# Patient Record
Sex: FEMALE | Race: WHITE
[De-identification: names, ages, dates, MRNs, and addresses within clinical notes are randomized per-mention and may not be internally consistent; named-entity substitution may affect disease eponyms.]

---

## 2018-02-12 ENCOUNTER — HOSPITAL ENCOUNTER (OUTPATIENT)
Dept: HOSPITAL 89 - LAB | Age: 78
End: 2018-02-12
Attending: INTERNAL MEDICINE
Payer: MEDICARE

## 2018-02-12 VITALS — BODY MASS INDEX: 25.37 KG/M2

## 2018-02-12 DIAGNOSIS — E78.5: ICD-10-CM

## 2018-02-12 DIAGNOSIS — D72.821: Primary | ICD-10-CM

## 2018-02-12 DIAGNOSIS — N18.3: ICD-10-CM

## 2018-02-12 DIAGNOSIS — E21.3: ICD-10-CM

## 2018-02-12 DIAGNOSIS — E83.52: ICD-10-CM

## 2018-02-12 DIAGNOSIS — I12.9: ICD-10-CM

## 2018-02-12 LAB
LDLC SERPL-MCNC: 102 MG/DL
PLATELET COUNT, AUTOMATED: 233 K/UL (ref 150–450)

## 2018-02-12 PROCEDURE — 83718 ASSAY OF LIPOPROTEIN: CPT

## 2018-02-12 PROCEDURE — 82310 ASSAY OF CALCIUM: CPT

## 2018-02-12 PROCEDURE — 84295 ASSAY OF SERUM SODIUM: CPT

## 2018-02-12 PROCEDURE — 84132 ASSAY OF SERUM POTASSIUM: CPT

## 2018-02-12 PROCEDURE — 82465 ASSAY BLD/SERUM CHOLESTEROL: CPT

## 2018-02-12 PROCEDURE — 84478 ASSAY OF TRIGLYCERIDES: CPT

## 2018-02-12 PROCEDURE — 36415 COLL VENOUS BLD VENIPUNCTURE: CPT

## 2018-02-12 PROCEDURE — 82374 ASSAY BLOOD CARBON DIOXIDE: CPT

## 2018-02-12 PROCEDURE — 82565 ASSAY OF CREATININE: CPT

## 2018-02-12 PROCEDURE — 82947 ASSAY GLUCOSE BLOOD QUANT: CPT

## 2018-02-12 PROCEDURE — 82306 VITAMIN D 25 HYDROXY: CPT

## 2018-02-12 PROCEDURE — 85025 COMPLETE CBC W/AUTO DIFF WBC: CPT

## 2018-02-12 PROCEDURE — 83970 ASSAY OF PARATHORMONE: CPT

## 2018-02-12 PROCEDURE — 82435 ASSAY OF BLOOD CHLORIDE: CPT

## 2018-02-12 PROCEDURE — 84520 ASSAY OF UREA NITROGEN: CPT

## 2018-02-23 ENCOUNTER — HOSPITAL ENCOUNTER (OUTPATIENT)
Dept: HOSPITAL 89 - RAD | Age: 78
End: 2018-02-23
Attending: INTERNAL MEDICINE
Payer: MEDICARE

## 2018-02-23 VITALS — BODY MASS INDEX: 25.37 KG/M2

## 2018-02-23 DIAGNOSIS — Z78.0: ICD-10-CM

## 2018-02-23 DIAGNOSIS — Z13.820: Primary | ICD-10-CM

## 2018-02-23 PROCEDURE — 77080 DXA BONE DENSITY AXIAL: CPT

## 2018-02-23 NOTE — RADIOLOGY IMAGING REPORT
FACILITY: [facility] 

 

PATIENT NAME: Danica Zapata

: 1940

MR: [id number]

V: [id number]

EXAM DATE: 

ORDERING PHYSICIAN: SAEED FOSTER

TECHNOLOGIST: 

 

Location: [facility]

Patient: Danica Zapata

: 1940

MRN: [id number]

Visit/Account:[id number]

Date of Sevice:  2018

 

ACCESSION #: [id number]

 

DEXA Scan

 

Clinical history:  Postmenopausal estrogen deficiency.

 

Comparison: None available.

 

LUMBAR SPINE:

The bone mineral density (BMD) measured from L2 and L4 correlates with a Z-score 6.6 and a T-score of
 5.1 which is Normal as defined by the World Health Organization.  The corresponding risk of fracture
 in the lumbar spine is Not increased compared with a young adult reference population.

 

HIP:

Bone mineral density (BMD) measured in the Left total hip region correlates with a Z-score 1.7 and a 
T-score of 0.1 which is Normal as defined by the World Health Organization.  The corresponding risk o
f fracture in the hip is Not increased compared with a young adult reference population.   T score le
ft femoral neck -0.2

 

Bone mineral density (BMD) measured in the Femoral Neck region measures 1.011 g/cm2.

 

Impression:

1.  Lumbar spine:  Normal.

2.  Left Hip:  Normal.

3.  Femoral Neck:  Bone Mineral Density is 1.011 g/cm2

 

The next DEXA scan of this patient should include the following sites: L1-L4 and the left hip.

 

FRAX? WHO Fracture Risk Assessment Tool link:

<http://www.shef.ac.uk/FRAX/tool.jsp?locationValue=9>

 

PLEASE NOTE:

1)  The World Health Organization defines low BMD as follows:

                                                                       T-score

                   Normal                                  > -1

                   Osteopenia                           < -1 and  > -2.5

                   Osteoporosis                         < -2.5 without fractures

                   Established osteoporosis       < -2.5 with fractures

2)  In general, you may wish to consider:

                    Diagnosis                  Treatment                       Follow-up DEXA

 

                    Normal BMD            Prevention                      2-3 years

                    Osteopenia                Prevention/therapy         1-2 years

                    Osteoporosis             Therapy                           Yearly

3)  Fracture risk estimated from the T-score is more accurate for vertebral fractures (often spontane
ous) than for hip fractures.

 

 

Report Dictated By: Aleyda Blackburn MD at 2018 12:07 PM

 

Report E-Signed By: Aleyda Blackburn MD  at 2018 12:08 PM

 

WSN:[id number]

## 2018-03-05 ENCOUNTER — HOSPITAL ENCOUNTER (OUTPATIENT)
Dept: HOSPITAL 89 - LAB | Age: 78
End: 2018-03-05
Attending: INTERNAL MEDICINE
Payer: MEDICARE

## 2018-03-05 VITALS — BODY MASS INDEX: 25.37 KG/M2

## 2018-03-05 DIAGNOSIS — Z86.39: Primary | ICD-10-CM

## 2018-03-05 PROCEDURE — 82310 ASSAY OF CALCIUM: CPT

## 2018-03-05 PROCEDURE — 36415 COLL VENOUS BLD VENIPUNCTURE: CPT

## 2018-04-05 ENCOUNTER — HOSPITAL ENCOUNTER (OUTPATIENT)
Dept: HOSPITAL 89 - LAB | Age: 78
End: 2018-04-05
Attending: INTERNAL MEDICINE
Payer: MEDICARE

## 2018-04-05 ENCOUNTER — HOSPITAL ENCOUNTER (INPATIENT)
Dept: HOSPITAL 89 - ER | Age: 78
LOS: 14 days | Discharge: NURSING, REHAB OR LONG-TERM CARE | DRG: 208 | End: 2018-04-19
Attending: INTERNAL MEDICINE | Admitting: INTERNAL MEDICINE
Payer: MEDICARE

## 2018-04-05 ENCOUNTER — HOSPITAL ENCOUNTER (OUTPATIENT)
Dept: HOSPITAL 89 - CT | Age: 78
End: 2018-04-05
Attending: INTERNAL MEDICINE
Payer: MEDICARE

## 2018-04-05 VITALS — SYSTOLIC BLOOD PRESSURE: 193 MMHG | DIASTOLIC BLOOD PRESSURE: 101 MMHG

## 2018-04-05 VITALS
WEIGHT: 183 LBS | WEIGHT: 183 LBS | BODY MASS INDEX: 31.24 KG/M2 | HEIGHT: 64 IN | HEIGHT: 64 IN | BODY MASS INDEX: 31.24 KG/M2

## 2018-04-05 VITALS — BODY MASS INDEX: 25.37 KG/M2 | BODY MASS INDEX: 25.37 KG/M2

## 2018-04-05 VITALS — SYSTOLIC BLOOD PRESSURE: 165 MMHG | DIASTOLIC BLOOD PRESSURE: 75 MMHG

## 2018-04-05 VITALS — DIASTOLIC BLOOD PRESSURE: 62 MMHG | SYSTOLIC BLOOD PRESSURE: 170 MMHG

## 2018-04-05 VITALS — SYSTOLIC BLOOD PRESSURE: 194 MMHG | DIASTOLIC BLOOD PRESSURE: 92 MMHG

## 2018-04-05 DIAGNOSIS — Z88.8: ICD-10-CM

## 2018-04-05 DIAGNOSIS — I27.20: ICD-10-CM

## 2018-04-05 DIAGNOSIS — J18.1: ICD-10-CM

## 2018-04-05 DIAGNOSIS — J96.00: ICD-10-CM

## 2018-04-05 DIAGNOSIS — Z90.710: ICD-10-CM

## 2018-04-05 DIAGNOSIS — F17.210: ICD-10-CM

## 2018-04-05 DIAGNOSIS — I51.7: Primary | ICD-10-CM

## 2018-04-05 DIAGNOSIS — N18.3: ICD-10-CM

## 2018-04-05 DIAGNOSIS — I13.0: ICD-10-CM

## 2018-04-05 DIAGNOSIS — I50.21: ICD-10-CM

## 2018-04-05 DIAGNOSIS — E78.5: ICD-10-CM

## 2018-04-05 DIAGNOSIS — Z98.82: ICD-10-CM

## 2018-04-05 DIAGNOSIS — Z92.21: ICD-10-CM

## 2018-04-05 DIAGNOSIS — J44.0: ICD-10-CM

## 2018-04-05 DIAGNOSIS — I47.1: ICD-10-CM

## 2018-04-05 DIAGNOSIS — E87.6: ICD-10-CM

## 2018-04-05 DIAGNOSIS — J44.1: Primary | ICD-10-CM

## 2018-04-05 DIAGNOSIS — I46.2: ICD-10-CM

## 2018-04-05 DIAGNOSIS — E03.9: ICD-10-CM

## 2018-04-05 DIAGNOSIS — R94.31: ICD-10-CM

## 2018-04-05 DIAGNOSIS — K92.2: ICD-10-CM

## 2018-04-05 DIAGNOSIS — Z99.81: ICD-10-CM

## 2018-04-05 DIAGNOSIS — R91.8: ICD-10-CM

## 2018-04-05 DIAGNOSIS — E83.52: ICD-10-CM

## 2018-04-05 DIAGNOSIS — F41.8: ICD-10-CM

## 2018-04-05 DIAGNOSIS — M41.84: ICD-10-CM

## 2018-04-05 DIAGNOSIS — Z85.3: ICD-10-CM

## 2018-04-05 DIAGNOSIS — I25.10: Primary | ICD-10-CM

## 2018-04-05 LAB — PLATELET COUNT, AUTOMATED: 227 K/UL (ref 150–450)

## 2018-04-05 PROCEDURE — 83735 ASSAY OF MAGNESIUM: CPT

## 2018-04-05 PROCEDURE — 82374 ASSAY BLOOD CARBON DIOXIDE: CPT

## 2018-04-05 PROCEDURE — 94660 CPAP INITIATION&MGMT: CPT

## 2018-04-05 PROCEDURE — 84484 ASSAY OF TROPONIN QUANT: CPT

## 2018-04-05 PROCEDURE — 86850 RBC ANTIBODY SCREEN: CPT

## 2018-04-05 PROCEDURE — 82435 ASSAY OF BLOOD CHLORIDE: CPT

## 2018-04-05 PROCEDURE — 82803 BLOOD GASES ANY COMBINATION: CPT

## 2018-04-05 PROCEDURE — 93005 ELECTROCARDIOGRAM TRACING: CPT

## 2018-04-05 PROCEDURE — 94003 VENT MGMT INPAT SUBQ DAY: CPT

## 2018-04-05 PROCEDURE — 86900 BLOOD TYPING SEROLOGIC ABO: CPT

## 2018-04-05 PROCEDURE — 86920 COMPATIBILITY TEST SPIN: CPT

## 2018-04-05 PROCEDURE — 84075 ASSAY ALKALINE PHOSPHATASE: CPT

## 2018-04-05 PROCEDURE — 71046 X-RAY EXAM CHEST 2 VIEWS: CPT

## 2018-04-05 PROCEDURE — 85379 FIBRIN DEGRADATION QUANT: CPT

## 2018-04-05 PROCEDURE — 36415 COLL VENOUS BLD VENIPUNCTURE: CPT

## 2018-04-05 PROCEDURE — 94770: CPT

## 2018-04-05 PROCEDURE — 84132 ASSAY OF SERUM POTASSIUM: CPT

## 2018-04-05 PROCEDURE — 82310 ASSAY OF CALCIUM: CPT

## 2018-04-05 PROCEDURE — 97167 OT EVAL HIGH COMPLEX 60 MIN: CPT

## 2018-04-05 PROCEDURE — 84520 ASSAY OF UREA NITROGEN: CPT

## 2018-04-05 PROCEDURE — 82040 ASSAY OF SERUM ALBUMIN: CPT

## 2018-04-05 PROCEDURE — 82947 ASSAY GLUCOSE BLOOD QUANT: CPT

## 2018-04-05 PROCEDURE — 84450 TRANSFERASE (AST) (SGOT): CPT

## 2018-04-05 PROCEDURE — 84460 ALANINE AMINO (ALT) (SGPT): CPT

## 2018-04-05 PROCEDURE — 71045 X-RAY EXAM CHEST 1 VIEW: CPT

## 2018-04-05 PROCEDURE — 82247 BILIRUBIN TOTAL: CPT

## 2018-04-05 PROCEDURE — 86901 BLOOD TYPING SEROLOGIC RH(D): CPT

## 2018-04-05 PROCEDURE — 85025 COMPLETE CBC W/AUTO DIFF WBC: CPT

## 2018-04-05 PROCEDURE — 99285 EMERGENCY DEPT VISIT HI MDM: CPT

## 2018-04-05 PROCEDURE — 93306 TTE W/DOPPLER COMPLETE: CPT

## 2018-04-05 PROCEDURE — 84295 ASSAY OF SERUM SODIUM: CPT

## 2018-04-05 PROCEDURE — 84443 ASSAY THYROID STIM HORMONE: CPT

## 2018-04-05 PROCEDURE — 83880 ASSAY OF NATRIURETIC PEPTIDE: CPT

## 2018-04-05 PROCEDURE — 94668 MNPJ CHEST WALL SBSQ: CPT

## 2018-04-05 PROCEDURE — 82565 ASSAY OF CREATININE: CPT

## 2018-04-05 PROCEDURE — 94002 VENT MGMT INPAT INIT DAY: CPT

## 2018-04-05 PROCEDURE — 84155 ASSAY OF PROTEIN SERUM: CPT

## 2018-04-05 PROCEDURE — 97162 PT EVAL MOD COMPLEX 30 MIN: CPT

## 2018-04-05 PROCEDURE — 71275 CT ANGIOGRAPHY CHEST: CPT

## 2018-04-05 PROCEDURE — 36600 WITHDRAWAL OF ARTERIAL BLOOD: CPT

## 2018-04-05 PROCEDURE — 94640 AIRWAY INHALATION TREATMENT: CPT

## 2018-04-05 RX ADMIN — DILTIAZEM HYDROCHLORIDE ONE MG: 60 CAPSULE, EXTENDED RELEASE ORAL at 22:30

## 2018-04-05 RX ADMIN — IPRATROPIUM BROMIDE AND ALBUTEROL SULFATE SCH ML: .5; 3 SOLUTION RESPIRATORY (INHALATION) at 21:45

## 2018-04-05 RX ADMIN — DILTIAZEM HYDROCHLORIDE ONE MG: 60 CAPSULE, EXTENDED RELEASE ORAL at 21:45

## 2018-04-05 RX ADMIN — FLUTICASONE PROPIONATE AND SALMETEROL SCH EACH: 50; 250 POWDER RESPIRATORY (INHALATION) at 21:50

## 2018-04-05 NOTE — EKG
[place] [facility] [place] 

 

PATIENT NAME: CARLINE JACKSON

: [id number]

MR: [id number]

V: [id number]

EXAM DATE: 

ORDERING PHYSICIAN: SAEED FOSTER

TECHNOLOGIST: ROMINA

 

Test Reason : DYSPNEA

Blood Pressure : ***/*** mmHG

Vent. Rate : 079 BPM     Atrial Rate : 079 BPM

   P-R Int : 128 ms          QRS Dur : 092 ms

    QT Int : 400 ms       P-R-T Axes : 070 -84 076 degrees

   QTc Int : 458 ms

 

Sinus rhythm with premature supraventricular complexes

Left axis deviation

Inferior infarct , age undetermined

Abnormal ECG

No previous ECGs available

 

Referred By:  CRISTIAN           Confirmed By:

## 2018-04-05 NOTE — HISTORY & PHYSICAL
History of Present Illness


Chief Complaint


The patient is a 77 year old female with PMH significant for COPD with severe 

obstructive defect by PFTs in 2016 who presents with increased shortness of 

breath for the past 24-48hours.


History of Present Illness


The patient is a current smoker. She is on multiple medications for her COPD. 

She did participate in pulmonary rehabilitation in 2016. Pre rehab PFTs showed 

an FEV1 of .66. She had been on oxygen 2L at HS only but about 4 months ago had 

to increase it to 24 hours daily. 





Her shortness of breath has worsened over the past 24-48 hours. The patient 

denies fever, chills or cough.  She had a URI at the end of 2017 and was 

treated with Levaquin and steroids. Those symptoms resolved but she remained on 

oxygen /. She notes that with the shortness of breath her chest has felt 

tight and she has had difficulty getting air in and out. She has been wheezing 

more. She does have risk factors for CAD including +FH, smoking, HTN and 

hyperlipidemia. She did have some LE edema but this resolved when she recently 

stopped taking amlodipine. Her BP has been high off of amlodipine and 

diuretics. Diuretics were stopped due to hypercalcemia. She has a history of 

hyperparathyroidism and is s/p parathyroidectomy. Her PCP is monitoring her 

calcium level which has improved off of calcium supplements and diuretics. The 

patient states she has not smoked for 2 days due to shortness of breath. She 

would like to quit smoking.





She denies orthopnea or PND. She has not had palpitations, diaphoresis or 

nausea.





History


Problems:  


(1) Breast cancer


Status:  Resolved


(2) Hyperlipidemia


Status:  Chronic


(3) CKD (chronic kidney disease), stage III


Status:  Chronic


(4) COPD (chronic obstructive pulmonary disease)


Status:  Chronic


(5) H/O hyperparathyroidism


Status:  Chronic


(6) Hypothyroidism


Status:  Chronic


(7) Depression


Status:  Chronic


(8) Anxiety


Status:  Chronic


(9) Upper GI bleed


Status:  Chronic


(10) Ulcer


Status:  Resolved


(11) Hypertension


Status:  Chronic


Home Meds


Active Scripts


Benazepril Hcl (BENAZEPRIL HCL) 40 Mg Tablet, 40 MG PO QDAY, #5 TAB 0 Refills


   Prov:SAEED DAVIS MD         18


Fluoxetine Hcl (PROZAC) 20 Mg Capsule, 20 MG PO QDAY, #90 CAPSULE 1 Refill


   Prov:SAEED DAVIS MD         3/13/18


Alprazolam (ALPRAZOLAM) 0.5 Mg Tab.rapdis, 0.5 MG PO QAM for Anxiety MDD .5 mg 

for 60 Days, #60 TAB


   Prov:SAEED DAVIS MD         18


Umeclidinium Bromide (Incruse Ellipta) 62.5 Mcg Blst.w.dev, 1 INH INH DAILY, #1 

INH 11 Refills


   Prov:SAEED DAVIS MD         18


Albuterol Sulfate 90 Mcg/Act (PROAIR HFA 90 MCG/ACT) 8.5 Gm Hfa.aer.ad, 2 PUFF 

IH Q4-6H, #1 INHALER 11 Refills


   Prov:SAEED DAVIS MD         18


Fluticasone/Salmeterol (ADVAIR 250-50 DISKUS) 1 Each Disk.w.dev, 1 PUFF IH BID, 

#3 INH 3 Refills


   Prov:SAEED DAVIS MD         18


Reported Medications


Vit A/Vit C/Vit E/Zinc/Copper (PRESERVISION AREDS SOFTGEL) 1 Each Capsule, 1 

EACH PO DAILY, CAPSULE


   18


Oxygen (OXYGEN)  Inha, 2 L INH, L


   17


Levothyroxine Sodium (LEVOTHYROXINE SODIUM) 100 Mcg Tablet, 100 MCG PO QDAY, TAB


   17


Discontinued Reported Medications


[areds]   No Conflict Check


   17


Multivitamin (DAILY MULTIPLE VITAMIN) 1 Each Tablet, 1 TAB PO DAILY


   17


Allergies:  


Coded Allergies:  


     NSAIDS (Non-Steroidal Anti-Inflamma (Verified  Adverse Reaction, Unknown, 

Pt does not take NSAIDS, 18)


 PT HAS HISTORY OF GASTRIC ULCERS.


Patient History:  


FH: arthritis


  MOTHER, , Age:89


  BROTHER OR SISTER


  BROTHER OR SISTER


FH: breast cancer


  BROTHER OR SISTER


FH: cancer


FH: heart attack


  FATHER, , Age:73


  MOTHER, , Age:89


FH: heart disease


  BROTHER OR SISTER


Other Social/Family Hx


The patient is  and lives with her  in [place]. She is a retired 

nurse. She has children but none of them live in [place]. Her  has 

dementia and she cares for him.


Hx Smoking:  Yes


Smoking Status:  Current: Every Day Smoker, Current: Some Days Smoker


Exposure to Second Hand Smoke?:  No


Caffeine Intake:  Coffee


Caffeine/Cups Per Day:  2-3


Hx Alcohol Use:  Yes


Hx Substance Use Disorder:  No


Social Drug Use:  Never


History of IV Drug Use:  No





Review of Systems


All Systems Reviewed/Normal:  Yes, Except as Noted


Constitutional:  No Fever


Cardiovascular:  No Chest Pain, No Palpitations


Respiratory:  Shortness of Breath, Wheezing, Other (Feels tight in chest with 

difficulty getting air in and out.), No Cough


Gastrointestinal:  No Nausea


Psychiatric:  Depression, Anxiety





Exam


Vital Signs





Vital Signs








  Date Time  Temp Pulse Resp B/P (MAP) Pulse Ox O2 Delivery O2 Flow Rate FiO2


 


18 21:11  81  194/92 (126) 91 Nasal Cannula 2.0 


 


18 20:45 98.5  24     








General Appearance:  Alert, Awake, No Acute Distress, Afebrile


Neuro:  No Gross deficits


Eyes:  PERRLA


Cardiovascular:  Regular Rate and Rhythm


Respiratory:  Other (Markedly decreased BS in lower lung fields with expiratory 

wheezes noted. )


GI:  Abd Soft and Non-Tender (BS+)


Lymph:  Cervical Nodes Benign


Extremities:  Warm, Perfused, Other (No significant edema.)


Psych:  Alert & Oriented X3, Appropriate Mood & Affect





Medical Decision Making


Data Points











Item Value  Date Time


 


D-Dimer Quantitative (PE/DVT) 1.05 ug/ml H 18 1100


 


Sodium Level 140 mmol/L 18 1100


 


Potassium Level 3.6 mmol/L 18 1100


 


Chloride Level 99 mmol/L 18 1100


 


Carbon Dioxide Level 31 mmol/L 18 1100


 


Blood Urea Nitrogen 21 mg/dl H 18 1100


 


Creatinine 1.10 mg/dl H 18 1100


 


Glomerular Filtration Rate Calc 48.2 18 1100


 


Random Glucose 96 mg/dl 18 1100


 


Calcium Level 10.3 mg/dl H 18 1100


 


Total Bilirubin 0.3 mg/dl 18 1100


 


Aspartate Amino Transf (AST/SGOT) 24 U/L 18 1100


 


Alanine Aminotransferase (ALT/SGPT) 23 U/L 18 1100


 


Alkaline Phosphatase 81 U/L 18 1100


 


Total Protein 6.4 gm/dl 18 1100


 


Albumin 3.7 g/dl 18 1100


 


Thyroid Stimulating Hormone (TSH) 0.55 uIU/ml 18 1100


 


Troponin I 0.044 ng/ml 18 1100


 


Troponin I 0.046 ng/ml 18 1512


 


Troponin I 0.048 ng/ml 18 1714


 


B-Type Natriuretic Peptide 650 pg/ml H 18 1714


 


White Blood Count 6.9 k/uL 18 1100


 


Red Blood Count 4.19 M/uL 18 1100


 


Hemoglobin 13.1 g/dL 18 1100


 


Hematocrit 38.7 % 18 1100


 


Mean Corpuscular Volume 92.2 fL 18 1100


 


Mean Corpuscular Hemoglobin 31.3 pg 18 1100


 


Mean Corpuscular Hemoglobin Concent 34.0 g/dL 18 1100


 


Red Cell Distribution Width 14.0 % 18 1100


 


Platelet Count 227 K/uL 18 1100


 


Mean Platelet Volume 9.5 fL 18 1100


 


Neutrophils (%) (Auto) 72.6 % H 18 1100


 


Lymphocytes (%) (Auto) 14.0 % L 18 1100


 


Monocytes (%) (Auto) 11.1 % 18 1100


 


Eosinophils (%) (Auto) 0.9 % 18 1100


 


Basophils (%) (Auto) 1.4 % 18 1100


 


Nucleated RBC Relative Count (auto) 0.0 /100WBC 18 1100


 


Neutrophils # (Auto) 5.0 K/uL 18 1100


 


Lymphocytes # (Auto) 1.0 K/uL L 18 1100


 


Monocytes # (Auto) 0.8 K/uL 18 1100


 


Eosinophils # (Auto) 0.1 K/uL 18 1100


 


Basophils # (Auto) 0.1 K/uL 18 1100


 


Nucleated RBC Absolute Count (auto) 0.00 K/uL 18 1100











EKG / Imaging


EKG Interpretation


[place] [facility] [place] 


 


PATIENT NAME: CARLINE ZAPATA


: [id number]


MR: [id number]


V: [id number]


EXAM DATE: 


ORDERING PHYSICIAN: ELIAS MYRICK


TECHNOLOGIST: 


 


Test Reason : SOB


Blood Pressure : ***/*** mmHG


Vent. Rate : 083 BPM     Atrial Rate : 083 BPM


   P-R Int : 144 ms          QRS Dur : 090 ms


    QT Int : 388 ms       P-R-T Axes : 059 -72 068 degrees


   QTc Int : 455 ms


 


Normal sinus rhythm


Possible Left atrial enlargement


Left axis deviation


Septal infarct , age undetermined


Abnormal ECG


When compared with ECG of 2018 10:01,


premature supraventricular complexes are no longer present


 


Referred By:  RAMEZ           Confirmed By: 

















D:18 [id number]


T: 


/


Imaging


[place] [facility] [place] 


 


PATIENT NAME: Carline Zapata


: 1940


MR: [id number]


V: [id number]


EXAM DATE: 


ORDERING PHYSICIAN: SAEED DAVIS


TECHNOLOGIST: 


 


Location: [facility] [place]


Patient: Carline Zapata


: 1940


MRN: [id number]


Visit/Account:[id number]


Date of Sevice:  2018


 


ACCESSION #: [id number]


 


Exam type: CHEST PA AND LAT


 


History: Dyspnea


 


Comparison: 2017.


 


Findings:


 


Cardiac silhouette is borderline enlarged but unchanged.  There is no evidence 

of acute appearing infiltrates, pleural effusions or overt pulmonary edema.  No 

evidence of a pneumothorax or pneumomediastinum.  There is S-shaped scoliosis 

of the thoracolumbar spine.


 


IMPRESSION:


 


1.  No acute cardiopulmonary process is seen


 


Report Dictated By: Aleyda Blackburn MD at 2018 11:50 AM


 


Report E-Signed By: Aleyda Blackburn MD  at 2018 11:51 AM


 


WSN:[id number]





[place] [facility] [place] 


 


PATIENT NAME: Carline Zapata


: 1940


MR: [id number]


V: [id number]


EXAM DATE: 


ORDERING PHYSICIAN: SAEED DAVIS


TECHNOLOGIST: 


 


Location: [facility] [place]


Patient: Carline Zapata


: 1940


MRN: [id number]


Visit/Account:[id number]


Date of Sevice:  2018


 


ACCESSION #: [id number]


 


EXAMINATION:   CTA of the chest with IV contrast


 


HISTORY:  Elevated d-dimer. Shortness of breath.


 


TECHNIQUE:   Pulmonary embolus protocol - Thin axial CT images of the chest 

were obtained with IV contrast during maximal pulmonary arterial opacification. 

Reconstruction of the source data includes multiplanar 2D coronal and sagittal 

reconstructed images, and 3D coronal and sagittal MIP images. Representative 

images have been stored on PACS.


One of the following dose optimization techniques was utilized in the 

performance of this exam: Automated exposure control; adjustment of the mA and/

or kV according to the patient's size; or use of an iterative  reconstruction 

technique.  Specific details can be referenced in the facility's radiology CT 

exam operational policy.


Contrast:   75 mL of IV Isovue-370.


 


COMPARISON:   None.


 


FINDINGS:


Pulmonary arteries:  The pulmonary arteries are well opacified, without 

suspicious filling defect.


Heart, aorta, and great vessels:  Normal caliber thoracic aorta. Vascular 

calcifications, including coronary artery calcifications. Normal heart size. No 

pericardial effusion.


 


Lungs and pleura:  Slight scarring or linear atelectasis in the right middle 

lobe and lingula. Calcified granuloma in the right upper lobe posteriorly. The 

lungs are otherwise clear. No abnormal pulmonary nodule, mass, or 

consolidation. The central airways are patent. No pleural effusion or 

pneumothorax.


Mediastinum and munir:  Negative.


 


Visualized upper abdomen:  Unremarkable.


Chest wall:  Bilateral breast implants with capsular calcification. There is 

lobulation along the medial medial margin of both implants which may represent 

chronic extracapsular rupture.


Bones:  Negative.


 


IMPRESSION:


1. No evidence of pulmonary embolism.


2. No other acute findings in the chest.


3. Slight scarring or linear atelectasis in the right middle lobe and lingula. 

The lungs are otherwise clear.


 


Report Dictated By: Walt Man MD at 2018 2:45 PM


 


Report E-Signed By: Walt Man MD  at 2018 2:55 PM


 


WSN:[id number]





Pre-Admit Course


Medical Record Review:  Yes





Assessment and Plan


Problems:  


(1) COPD with exacerbation


Status:  Acute


Assessment & Plan:  The patient was audibly wheezing during my interview. Will 

place on IV steroids. Will order Duonebs tid and albuterol nebs prn. Will 

continue Advair. She will have someone bring her Incruse Ellipta tomorrow.





(2) Elevated brain natriuretic peptide (BNP) level


Status:  Acute


Assessment & Plan:  I suspect she has some pulmonary HTN and right heart 

failure with her severe COPD. Will order an echo for am.





(3) Hypertension


Status:  Chronic


Assessment & Plan:  She had been on diuretics, amlodipine and benazepril. Her 

diuretics were stopped due to hypercalcemia. Her amlodipine was stopped due to 

LE edema. Her BP is quite high here. Will start Cardizem. 





(4) Anxiety


Status:  Chronic


Assessment & Plan:  Continue fluoxetine and prn alprazolam.





(5) Depression


Status:  Chronic


Assessment & Plan:  Continue fluoxetine.





(6) Hypothyroidism


Status:  Chronic


Assessment & Plan:  TSH is 0.55. Continue levothyroxine 100mcg daily.





(7) CKD (chronic kidney disease), stage III


Status:  Chronic


Assessment & Plan:  Creatinine is elevated at 1.1. Monitor.





(8) Hyperlipidemia


Status:  Chronic


Assessment & Plan:  She is not currently on treatment. Dr. Davis is following.





Time Spent on Plan of Care:  < 30 min


Copies to:   SAEED DAVIS MD





Venous Thromboembolism


VTE Risk


Physician Assess for VTE Risk:  Yes


Patient's VTE Risk:  Low





VTE Diagnostic Test


2 Days Prior to Admit:  Yes





Antithrombotics


Is Pt On Any Antithrombotics?:  Yes





Exam


Sepsis Risk:  No Definite Risk











GABBY RODRIGUEZ MD 2018 22:09

## 2018-04-05 NOTE — ER REPORT
History and Physical


Time Seen By MD:  17:19


Hx. of Stated Complaint:  


PATIENT WAS SEEN  OFFICE TODAY FOR REGULAR CHECK-UP, PATIET HAS HAD 


INCREASED SHORTNESS OF BREATH, THEY IRMA BLOOD AND HER TROPONIN WAS ELEVATED, 


FIRST ONE AT 1100 WAS .044, THEN THEY REDREW IT AT 1512 AND IT WAS 0.046. THEY 


DID A CTA LOOKING FOR PE, BUT IT WAS NEGATIVE AS WELL. PT WAS SENT OVER DUE TO 


ELEVATED TROPONIN.


HPI/ROS


CHIEF COMPLAINT: Shortness of breath, indeterminate troponin





HISTORY OF PRESENT ILLNESS: 77-year-old female patient possessed emergency room 

with complaint of shortness of breath and indeterminate troponins. Patient was 

seen by her primary care provider today for complaint of shortness of breath. 

She states is been going on for the past 4 months. She states seemed to improve 

when she was in [place], however since she's returned it has worsened. Patient 

denies any fevers, chills, nausea, vomiting or diarrhea. When she was seen by 

her primary care provider today she did lab work, CBC, CMP, d-dimer, troponin. D

-dimer came back at 1.5, troponin was 0.044. CT pulmonary angiogram and was 

done which was negative, EKG showed no significant change, the results of the 

pulmonary angiogram showed no acute cardiopulmonary processes, no pulmonary 

emboli. Patient was referred to the emergency room for further evaluation.





REVIEW OF SYSTEMS:


Respiratory: As noted above


Cardiovascular: No chest pain, no palpitations.


Gastrointestinal: No vomiting, no abdominal pain.


Musculoskeletal: No back pain.


Allergies:  


Coded Allergies:  


     NSAIDS (Non-Steroidal Anti-Inflamma (Verified  Adverse Reaction, Unknown, 

Pt does not take NSAIDS, 4/5/18)


 PT HAS HISTORY OF GASTRIC ULCERS.


Home Meds


Active Scripts


Benazepril Hcl (BENAZEPRIL HCL) 40 Mg Tablet, 40 MG PO QDAY, #5 TAB 0 Refills


   Prov:SAEED FOSTER MD         4/4/18


Fluoxetine Hcl (PROZAC) 20 Mg Capsule, 20 MG PO QDAY, #90 CAPSULE 1 Refill


   Prov:SAEED FOSTER MD         3/13/18


Alprazolam (ALPRAZOLAM) 0.5 Mg Tab.rapdis, 0.5 MG PO QAM for Anxiety MDD .5 mg 

for 60 Days, #60 TAB


   Prov:SAEED FOSTER MD         2/27/18


Umeclidinium Bromide (Incruse Ellipta) 62.5 Mcg Blst.w.dev, 1 INH INH DAILY, #1 

INH 11 Refills


   Prov:SAEED FOSTER MD         2/19/18


Albuterol Sulfate 90 Mcg/Act (PROAIR HFA 90 MCG/ACT) 8.5 Gm Hfa.aer.ad, 2 PUFF 

IH Q4-6H, #1 INHALER 11 Refills


   Prov:SAEED FOSTER MD         2/12/18


Fluticasone/Salmeterol (ADVAIR 250-50 DISKUS) 1 Each Disk.w.dev, 1 PUFF IH BID, 

#3 INH 3 Refills


   Prov:SAEED FOSTER MD         2/12/18


Reported Medications


Vit A/Vit C/Vit E/Zinc/Copper (PRESERVISION AREDS SOFTGEL) 1 Each Capsule, 1 

EACH PO DAILY, CAPSULE


   4/5/18


Oxygen (OXYGEN)  Inha, 2 L INH, L


   12/29/17


Levothyroxine Sodium (LEVOTHYROXINE SODIUM) 100 Mcg Tablet, 100 MCG PO QDAY, TAB


   7/12/17


Discontinued Reported Medications


[areds]   No Conflict Check


   12/29/17


Multivitamin (DAILY MULTIPLE VITAMIN) 1 Each Tablet, 1 TAB PO DAILY


   7/12/17


Past Medical/Surgical History


Patient has a past medical history of hypertension, oxygen night, pneumonia, 

ulcers, GI bleed, arthritis, back pain, hypothyroidism, alcohol use, depression

, cancer, radiation treatment for cancer.


Patient has surgical history of appendectomy, hysterectomy, tubal ligation, 

tonsillectomy, lumpectomy, parathyroidectomy.


Reviewed Nurses Notes:  Yes


Hx Smoking:  Yes


Smoking Status:  Current: Every Day Smoker, Current: Some Days Smoker


Exposure to Second Hand Smoke?:  No


Hx Substance Use Disorder:  No


Hx Alcohol Use:  Yes


Constitutional





Vital Sign - Last 24 Hours








 4/5/18 4/5/18 4/5/18 4/5/18





 17:03 17:07 17:08 17:19


 


Pulse 105 100  


 


Resp 28   


 


B/P (MAP) 167/109   183/90 (121)


 


Pulse Ox 94 95  


 


O2 Flow Rate   2.0 





 4/5/18 4/5/18 4/5/18 4/5/18





 17:22 17:30 17:37 17:45


 


Pulse 87  80 


 


B/P (MAP)  179/90 (119)  175/89 (117)


 


Pulse Ox 91  92 





 4/5/18 4/5/18 4/5/18 4/5/18





 17:52 18:00 18:07 18:15


 


Pulse 84  82 


 


B/P (MAP)  176/93 (120)  175/90 (118)


 


Pulse Ox 92  92 





 4/5/18 4/5/18 4/5/18 4/5/18





 18:30 18:37 18:45 18:50


 


Pulse  85  81


 


B/P (MAP) 181/95 (123)  183/98 (126) 


 


Pulse Ox  94  94





 4/5/18 4/5/18 4/5/18 4/5/18





 19:00 19:20 19:35 19:50


 


Pulse  90 82 81


 


B/P (MAP) 192/108 (136)   


 


Pulse Ox  91 93 94





 4/5/18 4/5/18  





 19:55 20:00  


 


Pulse 80 81  


 


B/P (MAP)  ???/??? (1665)  


 


Pulse Ox 93 92  








Physical Exam


  General Appearance: The patient is alert, has no immediate need for airway 

protection and no current signs of toxicity.


Respiratory: Chest is non tender, lungs are clear to auscultation.


Cardiac: regular rate and rhythm


Gastrointestinal: Abdomen is soft and non tender, no masses, bowel sounds 

normal.


Musculoskeletal:  Neck: Neck is supple and non tender.


   Extremities have full range of motion and are non tender.


Skin: No rashes or lesions.





DIFFERENTIAL DIAGNOSIS: After history and physical exam differential diagnosis 

was considered for shortness of breath including but not limited to pulmonary 

infectious process, COPD, asthma, pulmonary embolus and congestive heart 

failure.





Medical Decision Making


Data Points


Laboratory





Hematology








Test


  4/5/18


17:14


 


Troponin I 0.048 ng/ml 


 


B-Type Natriuretic Peptide


  650 pg/ml


(0-100)








Chemistry








Test


  4/5/18


17:14


 


Troponin I 0.048 ng/ml 


 


B-Type Natriuretic Peptide


  650 pg/ml


(0-100)











ED Course/Re-evaluation


ED Course


Patient was admitted and examined, history and physical were obtained. 

Differential diagnoses were considered. On examination lungs are clear, heart 

was regular, patient had no edema to her ankles. A repeat troponin, BNP and EKG 

were done. EKG was on change from previous EKG today. Troponin did come back 

slightly elevated, 0.048. BNP was elevated at 630. I did discuss the case with 

Dr. Aldridge, cardiologist at [facility], we reviewed the patient's vital signs, her 

laboratory results, her CT pulmonary angiogram as well as her EKG. He felt that 

the patient would be best served with an admission, monitoring overnight with 

echo done in the morning. Hopefully at that time she would be discharged home. 

I discussed the case with Dr. Minda Graves, hospitalist, we reviewed the lab 

work, her vital signs. She felt that the BNP was most concerning of the lab 

results, feeling that the troponin was statistically unchanged. However felt 

that the patient could be admitted if she would prefer. I discussed this with 

the patient, patient did ultimately agree that she should be admitted. I 

discussed this with Dr. Graves, who agreed to accept the patient for 

admission. Diagnosis will be elevated BNP.


Decision to Disposition Date:  Apr 5, 2018


Decision to Disposition Time:  20:25





Depart


Departure


Latest Vital Signs





Vital Signs








  Date Time  Temp Pulse Resp B/P (MAP) Pulse Ox O2 Delivery O2 Flow Rate FiO2


 


4/5/18 20:00  81  ???/??? (1665) 92   


 


4/5/18 17:08       2.0 


 


4/5/18 17:03   28     








Impression:  


 Primary Impression:  


 Elevated brain natriuretic peptide (BNP) level


 Additional Impression:  


 Hypertension


Condition:  Condition Unchanged


Disposition:  Admitted from ER


Referrals:  


SAEED FOSTER MD (PCP)





Problem Qualifiers








 Additional Impression:  


 Hypertension


 Hypertension type:  essential hypertension  Qualified Codes:  I10 - Essential (

primary) hypertension








ELIAS MYRICK [facility] Apr 5, 2018 17:20

## 2018-04-05 NOTE — RADIOLOGY IMAGING REPORT
FACILITY: [facility] 

 

PATIENT NAME: Danica Zapata

: 1940

MR: [id number]

V: [id number]

EXAM DATE: 

ORDERING PHYSICIAN: SAEED FOSTER

TECHNOLOGIST: 

 

Location: [facility]

Patient: Danica Zapata

: 1940

MRN: [id number]

Visit/Account:[id number]

Date of Sevice:  2018

 

ACCESSION #: [id number]

 

EXAMINATION:   CTA of the chest with IV contrast

 

HISTORY:  Elevated d-dimer. Shortness of breath.

 

TECHNIQUE:   Pulmonary embolus protocol - Thin axial CT images of the chest were obtained with IV con
trast during maximal pulmonary arterial opacification. Reconstruction of the source data includes mul
tiplanar 2D coronal and sagittal reconstructed images, and 3D coronal and sagittal MIP images. Repres
entative images have been stored on PACS.

One of the following dose optimization techniques was utilized in the performance of this exam: Autom
ated exposure control; adjustment of the mA and/or kV according to the patient's size; or use of an i
terative  reconstruction technique.  Specific details can be referenced in the facility's radiology C
T exam operational policy.

Contrast:   75 mL of IV Isovue-370.

 

COMPARISON:   None.

 

FINDINGS:

Pulmonary arteries:  The pulmonary arteries are well opacified, without suspicious filling defect.

Heart, aorta, and great vessels:  Normal caliber thoracic aorta. Vascular calcifications, including c
oronary artery calcifications. Normal heart size. No pericardial effusion.

 

Lungs and pleura:  Slight scarring or linear atelectasis in the right middle lobe and lingula. Calcif
ied granuloma in the right upper lobe posteriorly. The lungs are otherwise clear. No abnormal pulmona
ry nodule, mass, or consolidation. The central airways are patent. No pleural effusion or pneumothora
x.

Mediastinum and munir:  Negative.

 

Visualized upper abdomen:  Unremarkable.

Chest wall:  Bilateral breast implants with capsular calcification. There is lobulation along the med
ial medial margin of both implants which may represent chronic extracapsular rupture.

Bones:  Negative.

 

IMPRESSION:

1. No evidence of pulmonary embolism.

2. No other acute findings in the chest.

3. Slight scarring or linear atelectasis in the right middle lobe and lingula. The lungs are otherwis
e clear.

 

Report Dictated By: Walt Man MD at 2018 2:45 PM

 

Report E-Signed By: Walt Man MD  at 2018 2:55 PM

 

WSN:[id number]

## 2018-04-05 NOTE — EKG
[place] [facility] [place] 

 

PATIENT NAME: CARLINE JACKSON

: [id number]

MR: [id number]

V: [id number]

EXAM DATE: 

ORDERING PHYSICIAN: ELIAS MYRICK

TECHNOLOGIST: KARINE

 

Test Reason : SOB

Blood Pressure : ***/*** mmHG

Vent. Rate : 083 BPM     Atrial Rate : 083 BPM

   P-R Int : 144 ms          QRS Dur : 090 ms

    QT Int : 388 ms       P-R-T Axes : 059 -72 068 degrees

   QTc Int : 455 ms

 

Normal sinus rhythm

Possible Left atrial enlargement

Left axis deviation

Septal infarct , age undetermined

Abnormal ECG

When compared with ECG of 2018 10:01,

premature supraventricular complexes are no longer present

Confirmed by GABBY CORRAL ([id number]) on 2018 6:27:13 AM

 

Referred By:  RAMEZ           Confirmed By:GABBY CORRAL

## 2018-04-05 NOTE — RADIOLOGY IMAGING REPORT
FACILITY: [facility] [place] 

 

PATIENT NAME: Danica Zapata

: 1940

MR: [id number]

V: [id number]

EXAM DATE: 

ORDERING PHYSICIAN: SAEED FOSTER

TECHNOLOGIST: 

 

Location: [facility]

Patient: Danica Zapata

: 1940

MRN: [id number]

Visit/Account:[id number]

Date of Sevice:  2018

 

ACCESSION #: [id number]

 

Exam type: CHEST PA AND LAT

 

History: Dyspnea

 

Comparison: 2017.

 

Findings:

 

Cardiac silhouette is borderline enlarged but unchanged.  There is no evidence of acute appearing inf
iltrates, pleural effusions or overt pulmonary edema.  No evidence of a pneumothorax or pneumomediast
inum.  There is S-shaped scoliosis of the thoracolumbar spine.

 

IMPRESSION:

 

1.  No acute cardiopulmonary process is seen

 

Report Dictated By: Aleyda Blackburn MD at 2018 11:50 AM

 

Report E-Signed By: Aleyda Blackburn MD  at 2018 11:51 AM

 

WSN:[id number]

## 2018-04-06 VITALS — SYSTOLIC BLOOD PRESSURE: 152 MMHG | DIASTOLIC BLOOD PRESSURE: 80 MMHG

## 2018-04-06 VITALS — DIASTOLIC BLOOD PRESSURE: 82 MMHG | SYSTOLIC BLOOD PRESSURE: 168 MMHG

## 2018-04-06 VITALS — DIASTOLIC BLOOD PRESSURE: 92 MMHG | SYSTOLIC BLOOD PRESSURE: 180 MMHG

## 2018-04-06 VITALS — SYSTOLIC BLOOD PRESSURE: 192 MMHG | DIASTOLIC BLOOD PRESSURE: 106 MMHG

## 2018-04-06 VITALS — SYSTOLIC BLOOD PRESSURE: 169 MMHG | DIASTOLIC BLOOD PRESSURE: 83 MMHG

## 2018-04-06 VITALS — SYSTOLIC BLOOD PRESSURE: 164 MMHG | DIASTOLIC BLOOD PRESSURE: 84 MMHG

## 2018-04-06 VITALS — SYSTOLIC BLOOD PRESSURE: 158 MMHG | DIASTOLIC BLOOD PRESSURE: 82 MMHG

## 2018-04-06 VITALS — SYSTOLIC BLOOD PRESSURE: 182 MMHG | DIASTOLIC BLOOD PRESSURE: 90 MMHG

## 2018-04-06 VITALS — DIASTOLIC BLOOD PRESSURE: 108 MMHG | SYSTOLIC BLOOD PRESSURE: 196 MMHG

## 2018-04-06 RX ADMIN — SODIUM CHLORIDE SCH MG: 900 INJECTION, SOLUTION INTRAVENOUS at 16:28

## 2018-04-06 RX ADMIN — FLUTICASONE PROPIONATE AND SALMETEROL SCH EACH: 50; 250 POWDER RESPIRATORY (INHALATION) at 16:32

## 2018-04-06 RX ADMIN — IPRATROPIUM BROMIDE AND ALBUTEROL SULFATE SCH ML: .5; 3 SOLUTION RESPIRATORY (INHALATION) at 16:32

## 2018-04-06 RX ADMIN — SODIUM CHLORIDE SCH MG: 900 INJECTION, SOLUTION INTRAVENOUS at 01:18

## 2018-04-06 RX ADMIN — SODIUM CHLORIDE SCH MG: 900 INJECTION, SOLUTION INTRAVENOUS at 08:15

## 2018-04-06 RX ADMIN — IPRATROPIUM BROMIDE AND ALBUTEROL SULFATE SCH ML: .5; 3 SOLUTION RESPIRATORY (INHALATION) at 11:07

## 2018-04-06 RX ADMIN — FLUTICASONE PROPIONATE AND SALMETEROL SCH EACH: 50; 250 POWDER RESPIRATORY (INHALATION) at 05:36

## 2018-04-06 RX ADMIN — IPRATROPIUM BROMIDE AND ALBUTEROL SULFATE SCH ML: .5; 3 SOLUTION RESPIRATORY (INHALATION) at 05:36

## 2018-04-06 NOTE — HOSPITALIST PROGRESS NOTE
Subjective


Progress Notes


Subjective


She reports less dyspnea and some improvement in activity tolerance.





Physical Exam





Vital Signs








  Date Time  Temp Pulse Resp B/P (MAP) Pulse Ox O2 Delivery O2 Flow Rate FiO2


 


4/6/18 07:30     92 Nasal Cannula 3.0 


 


4/6/18 07:30 98.6 88 16 182/90 (120)    








General Appearance:  Alert, Awake


Cardiovascular:  Regular Rate and Rhythm


Respiratory:  Other (bilaterally expiratory wheezes with prolonged expiratory 

phase)


Extremities:  Warm, Perfused, Edema (trace pedal/ankle edema)


Psych:  Alert & Oriented X3





Assessment and Plan


Problems:  


(1) COPD with exacerbation


Status:  Acute


Assessment & Plan:  She is improved somewhat on IV steroids, DuoNeb and 

albuterol. Will continue Advair. She will have someone bring her Incruse 

Ellipta. We also discussed tobacco cessation and she states she is now 

committed to quitting.





(2) Elevated brain natriuretic peptide (BNP) level


Status:  Acute


Assessment & Plan:  I suspect she has some pulmonary HTN and right heart 

failure with her severe COPD. Echocardiogram today.





(3) Hypertension


Status:  Chronic


Assessment & Plan:  She had been on diuretics, amlodipine and benazepril. Her 

diuretics were stopped due to hypercalcemia. Her amlodipine was stopped due to 

LE edema. Will monitor on the benazepril and modify as needed. 





(4) Anxiety


Status:  Chronic


Assessment & Plan:  Continue fluoxetine and alprazolam.





(5) Depression


Status:  Chronic


Assessment & Plan:  Continue fluoxetine.





(6) Hypothyroidism


Status:  Chronic


Assessment & Plan:  TSH is 0.55. Continue levothyroxine 100mcg daily.





(7) CKD (chronic kidney disease), stage III


Status:  Chronic


Assessment & Plan:  Creatinine was slightly elevated at 1.1. Monitor.





(8) Hyperlipidemia


Status:  Chronic


Assessment & Plan:  She is not currently on treatment. Dr. Davis is following.








Exam


Sepsis Risk:  No Definite Risk











ANDRE RODRIGUEZ MD Apr 6, 2018 10:00

## 2018-04-07 VITALS — DIASTOLIC BLOOD PRESSURE: 73 MMHG | SYSTOLIC BLOOD PRESSURE: 142 MMHG

## 2018-04-07 VITALS — SYSTOLIC BLOOD PRESSURE: 131 MMHG | DIASTOLIC BLOOD PRESSURE: 70 MMHG

## 2018-04-07 VITALS — SYSTOLIC BLOOD PRESSURE: 145 MMHG | DIASTOLIC BLOOD PRESSURE: 78 MMHG

## 2018-04-07 VITALS — SYSTOLIC BLOOD PRESSURE: 154 MMHG | DIASTOLIC BLOOD PRESSURE: 85 MMHG

## 2018-04-07 VITALS — SYSTOLIC BLOOD PRESSURE: 152 MMHG | DIASTOLIC BLOOD PRESSURE: 86 MMHG

## 2018-04-07 VITALS — DIASTOLIC BLOOD PRESSURE: 73 MMHG | SYSTOLIC BLOOD PRESSURE: 150 MMHG

## 2018-04-07 VITALS — SYSTOLIC BLOOD PRESSURE: 146 MMHG | DIASTOLIC BLOOD PRESSURE: 78 MMHG

## 2018-04-07 VITALS — DIASTOLIC BLOOD PRESSURE: 72 MMHG | SYSTOLIC BLOOD PRESSURE: 138 MMHG

## 2018-04-07 VITALS — DIASTOLIC BLOOD PRESSURE: 85 MMHG | SYSTOLIC BLOOD PRESSURE: 145 MMHG

## 2018-04-07 VITALS — DIASTOLIC BLOOD PRESSURE: 71 MMHG | SYSTOLIC BLOOD PRESSURE: 141 MMHG

## 2018-04-07 VITALS — DIASTOLIC BLOOD PRESSURE: 88 MMHG | SYSTOLIC BLOOD PRESSURE: 147 MMHG

## 2018-04-07 VITALS — DIASTOLIC BLOOD PRESSURE: 88 MMHG | SYSTOLIC BLOOD PRESSURE: 162 MMHG

## 2018-04-07 VITALS — SYSTOLIC BLOOD PRESSURE: 126 MMHG | DIASTOLIC BLOOD PRESSURE: 59 MMHG

## 2018-04-07 VITALS — DIASTOLIC BLOOD PRESSURE: 66 MMHG | SYSTOLIC BLOOD PRESSURE: 147 MMHG

## 2018-04-07 VITALS — SYSTOLIC BLOOD PRESSURE: 136 MMHG | DIASTOLIC BLOOD PRESSURE: 72 MMHG

## 2018-04-07 VITALS — SYSTOLIC BLOOD PRESSURE: 165 MMHG | DIASTOLIC BLOOD PRESSURE: 92 MMHG

## 2018-04-07 VITALS — SYSTOLIC BLOOD PRESSURE: 142 MMHG | DIASTOLIC BLOOD PRESSURE: 72 MMHG

## 2018-04-07 VITALS — SYSTOLIC BLOOD PRESSURE: 128 MMHG | DIASTOLIC BLOOD PRESSURE: 58 MMHG

## 2018-04-07 VITALS — SYSTOLIC BLOOD PRESSURE: 122 MMHG | DIASTOLIC BLOOD PRESSURE: 60 MMHG

## 2018-04-07 VITALS — SYSTOLIC BLOOD PRESSURE: 159 MMHG | DIASTOLIC BLOOD PRESSURE: 99 MMHG

## 2018-04-07 VITALS — DIASTOLIC BLOOD PRESSURE: 65 MMHG | SYSTOLIC BLOOD PRESSURE: 123 MMHG

## 2018-04-07 VITALS — DIASTOLIC BLOOD PRESSURE: 83 MMHG | SYSTOLIC BLOOD PRESSURE: 155 MMHG

## 2018-04-07 VITALS — SYSTOLIC BLOOD PRESSURE: 133 MMHG | DIASTOLIC BLOOD PRESSURE: 74 MMHG

## 2018-04-07 VITALS — DIASTOLIC BLOOD PRESSURE: 72 MMHG | SYSTOLIC BLOOD PRESSURE: 140 MMHG

## 2018-04-07 VITALS — SYSTOLIC BLOOD PRESSURE: 143 MMHG | DIASTOLIC BLOOD PRESSURE: 85 MMHG

## 2018-04-07 VITALS — SYSTOLIC BLOOD PRESSURE: 150 MMHG | DIASTOLIC BLOOD PRESSURE: 83 MMHG

## 2018-04-07 VITALS — DIASTOLIC BLOOD PRESSURE: 72 MMHG | SYSTOLIC BLOOD PRESSURE: 142 MMHG

## 2018-04-07 VITALS — SYSTOLIC BLOOD PRESSURE: 151 MMHG | DIASTOLIC BLOOD PRESSURE: 76 MMHG

## 2018-04-07 VITALS — DIASTOLIC BLOOD PRESSURE: 77 MMHG | SYSTOLIC BLOOD PRESSURE: 146 MMHG

## 2018-04-07 VITALS — DIASTOLIC BLOOD PRESSURE: 75 MMHG | SYSTOLIC BLOOD PRESSURE: 137 MMHG

## 2018-04-07 VITALS — SYSTOLIC BLOOD PRESSURE: 140 MMHG | DIASTOLIC BLOOD PRESSURE: 76 MMHG

## 2018-04-07 VITALS — SYSTOLIC BLOOD PRESSURE: 136 MMHG | DIASTOLIC BLOOD PRESSURE: 75 MMHG

## 2018-04-07 VITALS — DIASTOLIC BLOOD PRESSURE: 73 MMHG | SYSTOLIC BLOOD PRESSURE: 145 MMHG

## 2018-04-07 VITALS — DIASTOLIC BLOOD PRESSURE: 88 MMHG | SYSTOLIC BLOOD PRESSURE: 156 MMHG

## 2018-04-07 VITALS — DIASTOLIC BLOOD PRESSURE: 76 MMHG | SYSTOLIC BLOOD PRESSURE: 146 MMHG

## 2018-04-07 VITALS — SYSTOLIC BLOOD PRESSURE: 145 MMHG | DIASTOLIC BLOOD PRESSURE: 72 MMHG

## 2018-04-07 VITALS — SYSTOLIC BLOOD PRESSURE: 159 MMHG | DIASTOLIC BLOOD PRESSURE: 90 MMHG

## 2018-04-07 VITALS — SYSTOLIC BLOOD PRESSURE: 161 MMHG | DIASTOLIC BLOOD PRESSURE: 90 MMHG

## 2018-04-07 VITALS — SYSTOLIC BLOOD PRESSURE: 157 MMHG | DIASTOLIC BLOOD PRESSURE: 89 MMHG

## 2018-04-07 VITALS — DIASTOLIC BLOOD PRESSURE: 66 MMHG | SYSTOLIC BLOOD PRESSURE: 131 MMHG

## 2018-04-07 VITALS — DIASTOLIC BLOOD PRESSURE: 69 MMHG | SYSTOLIC BLOOD PRESSURE: 140 MMHG

## 2018-04-07 VITALS — SYSTOLIC BLOOD PRESSURE: 158 MMHG | DIASTOLIC BLOOD PRESSURE: 70 MMHG

## 2018-04-07 VITALS — DIASTOLIC BLOOD PRESSURE: 96 MMHG | SYSTOLIC BLOOD PRESSURE: 148 MMHG

## 2018-04-07 VITALS — DIASTOLIC BLOOD PRESSURE: 76 MMHG | SYSTOLIC BLOOD PRESSURE: 150 MMHG

## 2018-04-07 VITALS — DIASTOLIC BLOOD PRESSURE: 65 MMHG | SYSTOLIC BLOOD PRESSURE: 125 MMHG

## 2018-04-07 VITALS — SYSTOLIC BLOOD PRESSURE: 133 MMHG | DIASTOLIC BLOOD PRESSURE: 69 MMHG

## 2018-04-07 VITALS — SYSTOLIC BLOOD PRESSURE: 126 MMHG | DIASTOLIC BLOOD PRESSURE: 65 MMHG

## 2018-04-07 VITALS — SYSTOLIC BLOOD PRESSURE: 125 MMHG | DIASTOLIC BLOOD PRESSURE: 69 MMHG

## 2018-04-07 VITALS — SYSTOLIC BLOOD PRESSURE: 134 MMHG | DIASTOLIC BLOOD PRESSURE: 72 MMHG

## 2018-04-07 VITALS — SYSTOLIC BLOOD PRESSURE: 152 MMHG | DIASTOLIC BLOOD PRESSURE: 88 MMHG

## 2018-04-07 VITALS — SYSTOLIC BLOOD PRESSURE: 146 MMHG | DIASTOLIC BLOOD PRESSURE: 84 MMHG

## 2018-04-07 VITALS — SYSTOLIC BLOOD PRESSURE: 136 MMHG | DIASTOLIC BLOOD PRESSURE: 70 MMHG

## 2018-04-07 VITALS — DIASTOLIC BLOOD PRESSURE: 85 MMHG | SYSTOLIC BLOOD PRESSURE: 149 MMHG

## 2018-04-07 VITALS — SYSTOLIC BLOOD PRESSURE: 127 MMHG | DIASTOLIC BLOOD PRESSURE: 64 MMHG

## 2018-04-07 VITALS — SYSTOLIC BLOOD PRESSURE: 143 MMHG | DIASTOLIC BLOOD PRESSURE: 69 MMHG

## 2018-04-07 VITALS — DIASTOLIC BLOOD PRESSURE: 69 MMHG | SYSTOLIC BLOOD PRESSURE: 136 MMHG

## 2018-04-07 VITALS — SYSTOLIC BLOOD PRESSURE: 139 MMHG | DIASTOLIC BLOOD PRESSURE: 73 MMHG

## 2018-04-07 VITALS — DIASTOLIC BLOOD PRESSURE: 63 MMHG | SYSTOLIC BLOOD PRESSURE: 124 MMHG

## 2018-04-07 VITALS — SYSTOLIC BLOOD PRESSURE: 148 MMHG | DIASTOLIC BLOOD PRESSURE: 79 MMHG

## 2018-04-07 VITALS — DIASTOLIC BLOOD PRESSURE: 82 MMHG | SYSTOLIC BLOOD PRESSURE: 155 MMHG

## 2018-04-07 VITALS — SYSTOLIC BLOOD PRESSURE: 121 MMHG | DIASTOLIC BLOOD PRESSURE: 62 MMHG

## 2018-04-07 VITALS — DIASTOLIC BLOOD PRESSURE: 68 MMHG | SYSTOLIC BLOOD PRESSURE: 131 MMHG

## 2018-04-07 VITALS — SYSTOLIC BLOOD PRESSURE: 127 MMHG | DIASTOLIC BLOOD PRESSURE: 70 MMHG

## 2018-04-07 VITALS — DIASTOLIC BLOOD PRESSURE: 77 MMHG | SYSTOLIC BLOOD PRESSURE: 145 MMHG

## 2018-04-07 VITALS — DIASTOLIC BLOOD PRESSURE: 99 MMHG | SYSTOLIC BLOOD PRESSURE: 164 MMHG

## 2018-04-07 VITALS — SYSTOLIC BLOOD PRESSURE: 140 MMHG | DIASTOLIC BLOOD PRESSURE: 71 MMHG

## 2018-04-07 VITALS — DIASTOLIC BLOOD PRESSURE: 59 MMHG | SYSTOLIC BLOOD PRESSURE: 117 MMHG

## 2018-04-07 VITALS — DIASTOLIC BLOOD PRESSURE: 67 MMHG | SYSTOLIC BLOOD PRESSURE: 131 MMHG

## 2018-04-07 VITALS — DIASTOLIC BLOOD PRESSURE: 71 MMHG | SYSTOLIC BLOOD PRESSURE: 129 MMHG

## 2018-04-07 VITALS — DIASTOLIC BLOOD PRESSURE: 72 MMHG | SYSTOLIC BLOOD PRESSURE: 149 MMHG

## 2018-04-07 VITALS — SYSTOLIC BLOOD PRESSURE: 162 MMHG | DIASTOLIC BLOOD PRESSURE: 96 MMHG

## 2018-04-07 VITALS — DIASTOLIC BLOOD PRESSURE: 81 MMHG | SYSTOLIC BLOOD PRESSURE: 140 MMHG

## 2018-04-07 VITALS — SYSTOLIC BLOOD PRESSURE: 131 MMHG | DIASTOLIC BLOOD PRESSURE: 68 MMHG

## 2018-04-07 LAB
PLATELET COUNT, AUTOMATED: 169 K/UL (ref 150–450)
PLATELET COUNT, AUTOMATED: 268 K/UL (ref 150–450)

## 2018-04-07 PROCEDURE — 0BH17EZ INSERTION OF ENDOTRACHEAL AIRWAY INTO TRACHEA, VIA NATURAL OR ARTIFICIAL OPENING: ICD-10-PCS | Performed by: EMERGENCY MEDICINE

## 2018-04-07 PROCEDURE — 5A12012 PERFORMANCE OF CARDIAC OUTPUT, SINGLE, MANUAL: ICD-10-PCS | Performed by: EMERGENCY MEDICINE

## 2018-04-07 PROCEDURE — 5A1945Z RESPIRATORY VENTILATION, 24-96 CONSECUTIVE HOURS: ICD-10-PCS | Performed by: INTERNAL MEDICINE

## 2018-04-07 PROCEDURE — 30233K1 TRANSFUSION OF NONAUTOLOGOUS FROZEN PLASMA INTO PERIPHERAL VEIN, PERCUTANEOUS APPROACH: ICD-10-PCS | Performed by: INTERNAL MEDICINE

## 2018-04-07 RX ADMIN — LEVALBUTEROL HYDROCHLORIDE SCH MG: 1.25 SOLUTION RESPIRATORY (INHALATION) at 17:20

## 2018-04-07 RX ADMIN — LEVALBUTEROL HYDROCHLORIDE SCH MG: 1.25 SOLUTION RESPIRATORY (INHALATION) at 09:42

## 2018-04-07 RX ADMIN — SODIUM CHLORIDE SCH MG: 900 INJECTION, SOLUTION INTRAVENOUS at 09:36

## 2018-04-07 RX ADMIN — PROPOFOL PRN MLS/HR: 10 INJECTION, EMULSION INTRAVENOUS at 07:51

## 2018-04-07 RX ADMIN — LEVALBUTEROL HYDROCHLORIDE SCH MG: 1.25 SOLUTION RESPIRATORY (INHALATION) at 14:20

## 2018-04-07 RX ADMIN — SUCRALFATE SCH GM: 1 TABLET ORAL at 20:11

## 2018-04-07 RX ADMIN — SODIUM CHLORIDE SCH MG: 900 INJECTION, SOLUTION INTRAVENOUS at 01:08

## 2018-04-07 RX ADMIN — THIAMINE HYDROCHLORIDE PRN MLS/HR: 100 INJECTION, SOLUTION INTRAMUSCULAR; INTRAVENOUS at 17:47

## 2018-04-07 RX ADMIN — PANTOPRAZOLE SODIUM SCH MG: 40 INJECTION, POWDER, FOR SOLUTION INTRAVENOUS at 20:17

## 2018-04-07 RX ADMIN — THIAMINE HYDROCHLORIDE PRN MLS/HR: 100 INJECTION, SOLUTION INTRAMUSCULAR; INTRAVENOUS at 13:57

## 2018-04-07 RX ADMIN — SODIUM CHLORIDE SCH MG: 900 INJECTION, SOLUTION INTRAVENOUS at 16:43

## 2018-04-07 RX ADMIN — THIAMINE HYDROCHLORIDE PRN MLS/HR: 100 INJECTION, SOLUTION INTRAMUSCULAR; INTRAVENOUS at 05:24

## 2018-04-07 RX ADMIN — THIAMINE HYDROCHLORIDE PRN MLS/HR: 100 INJECTION, SOLUTION INTRAMUSCULAR; INTRAVENOUS at 06:38

## 2018-04-07 RX ADMIN — ENOXAPARIN SODIUM SCH MG: 100 INJECTION SUBCUTANEOUS at 06:37

## 2018-04-07 RX ADMIN — PROPOFOL PRN MLS/HR: 10 INJECTION, EMULSION INTRAVENOUS at 12:14

## 2018-04-07 RX ADMIN — PROPOFOL PRN MLS/HR: 10 INJECTION, EMULSION INTRAVENOUS at 16:43

## 2018-04-07 RX ADMIN — PROPOFOL PRN MLS/HR: 10 INJECTION, EMULSION INTRAVENOUS at 22:08

## 2018-04-07 RX ADMIN — LEVALBUTEROL HYDROCHLORIDE SCH MG: 1.25 SOLUTION RESPIRATORY (INHALATION) at 05:59

## 2018-04-07 RX ADMIN — Medication SCH EACH: at 21:11

## 2018-04-07 RX ADMIN — ENOXAPARIN SODIUM SCH MG: 100 INJECTION SUBCUTANEOUS at 18:26

## 2018-04-07 RX ADMIN — ACETAMINOPHEN SCH MLS/HR: 10 INJECTION, SOLUTION INTRAVENOUS at 23:15

## 2018-04-07 NOTE — RADIOLOGY IMAGING REPORT
FACILITY: [facility] 

 

PATIENT NAME: Danica Zapata

: 1940

MR: [id number]

V: [id number]

EXAM DATE: 

ORDERING PHYSICIAN: ANDRE RODRIGUEZ

TECHNOLOGIST: 

 

Location: [facility] [place]

Patient: Danica Zapata

: 1940

MRN: [id number]

Visit/Account:[id number]

Date of Sevice:  2018

 

ACCESSION #: [id number]

 

Portable chest:

 

Indication: Tube placement.

Technique: A single frontal film was obtained.

Comparison: 2018

 

Lines and tubes: A nasogastric tube is now in the stomach. The ET tube remains in satisfactory positi
on.

Skeletal and soft tissue structures: Intact and unchanged.

Heart and mediastinum: Stable.

Lung fields: Well-expanded. No new focal opacities.

Pleural spaces: No evidence of pneumothorax or significant effusion.

 

Impression: The NG tube is in satisfactory position. The chest is otherwise unchanged.

 

Report Dictated By: Art Reyes MD at 2018 7:06 AM

 

Report E-Signed By: Art Reyes MD  at 2018 7:08 AM

 

WSN:[id number]

## 2018-04-07 NOTE — EKG
FACILITY: [facility] - [place] 

 

PATIENT NAME: CARLINE JACKSON

: [id number]

MR: [id number]

V: [id number]

EXAM DATE: 

ORDERING PHYSICIAN: ANDRE RODRIGUEZ

TECHNOLOGIST: MARTINA

 

Test Reason : SVT

Blood Pressure : ***/*** mmHG

Vent. Rate : 152 BPM     Atrial Rate : 152 BPM

   P-R Int : 120 ms          QRS Dur : 088 ms

    QT Int : 322 ms       P-R-T Axes : 000 -66 083 degrees

   QTc Int : 511 ms

 

Sinus tachycardia

Left axis deviation

Septal infarct (cited on or before 2018)

Abnormal ECG

When compared with ECG of 2018 17:45,

Vent. rate has increased BY  69 BPM

Confirmed by GABBY CORRAL ([id number]) on 2018 10:28:34 PM

 

Referred By:             Confirmed By:GABBY CORRAL

## 2018-04-07 NOTE — RADIOLOGY IMAGING REPORT
FACILITY: [facility] 

 

PATIENT NAME: Danica Zapata

: 1940

MR: [id number]

V: [id number]

EXAM DATE: 

ORDERING PHYSICIAN: ANDRE RODRIGUEZ

TECHNOLOGIST: 

 

Location: [facility] - [place], [place]

Patient: Danica Zapata

: 1940

MRN: [id number]

Visit/Account:[id number]

Date of Sevice:  2018

 

ACCESSION #: [id number]

 

CHEST SINGLE AP 2018 04:23 hours.

 

HISTORY: Intubation.

 

COMPARISON: 2018 and studies dating to 10/25/2013.

 

TECHNIQUE: Portable AP view of the chest.

 

FINDINGS:

 

Tubes/lines/hardware: There are external chest leads. Endotracheal tube has been placed and terminate
s 4.5 cm above the peg. There are surgical clips at the right side of the neck and right upper ashley
st.

 

Pulmonary: There are bilateral densities due to overlying breast implants. There is minimal linear at
electasis or scarring at the left base. Right lung is clear. There is no pneumothorax or pleural effu
elena.

 

Cardiomediastinal: Cardiac silhouette is within normal limits. The fullness of the right paratracheal
 soft tissues is unchanged from  view of the chest CT and is due to normal vessels. There is mil
d aortic calcification.

 

Bones/soft tissues: No acute osseous abnormality. The visible abdomen is normal.

 

IMPRESSION:

1. ET tube is in good position.

2. Minimal linear atelectasis or scarring at the left base.

 

Report Dictated By: Ana Mathis at 2018 4:54 AM

 

Report E-Signed By: Ana Mathis  at 2018 4:59 AM

 

WSN:[id number]

## 2018-04-07 NOTE — HOSPITALIST PROGRESS NOTE
Subjective


Progress Notes


Subjective


Nursing reports patient was up to BR this AM. She had become more dyspneic. 

While she was in BR her HR was elevated into 150 bpm range. She then became 

unresponsive. She was felt to be without a pulse and Code Blue was called. She 

did receive rescue breathing and chest compressions for a short time. Once on 

the monitor, she was found to be in a narrow complex supraventricular rhythm. 

She did have a pulse and was actually hypertensive. She was intubated.





Physical Exam





Vital Signs








  Date Time  Temp Pulse Resp B/P (MAP) Pulse Ox O2 Delivery O2 Flow Rate FiO2


 


4/7/18 03:29 98.5 89 18 159/90 (113) 90 Nasal Cannula 2.0 








General Appearance:  Other (sedated on ventilator)


Neuro:  Other (she did begin to move all four extremities after intubation)


Eyes:  PERRLA


ENT:  Other (ET tube in place)


Cardiovascular:  Other (Tachycardic regular)


Respiratory:  Other (wheezes bilaterally)


GI:  Other (soft/BS present)


Extremities:  Warm, Perfused





Assessment and Plan


Problems:  


(1) Cardiorespiratory arrest


Status:  Acute


Assessment & Plan:  It appears she may have had arrest secondary to either SVT 

or her underlying COPD/acute exacerbation. She has been transferred to the ICU. 

Will continue intubated/mechanically ventilated. Will use sedation as needed. 

Will check labs, CXR, follow ABG closely. Will also manage her SVT.





(2) Supraventricular tachycardia


Status:  Acute


Assessment & Plan:  She has been in SVT throughout the code. We did convert to 

sinus rhythm with rates in 100 bpm range with adenosine 6mg IV. Question if 

this was the cause of the acute decompensation or a result of her acute 

respiratory problems. Will monitor closely in ICU. Check labs including 

electrolytes, troponin, d-dimer.





(3) COPD with exacerbation


Status:  Acute


Assessment & Plan:  She seemed to be improved somewhat until the acute episode 

this AM. She is now intubated. Will continue the steroids, respiratory 

treatments (with some modifications).





(4) Elevated brain natriuretic peptide (BNP) level


Status:  Acute


Assessment & Plan:  She has some pulmonary HTN with preserved EF (prelim report

) on echocardiogram done yesterday.





(5) Hypertension


Status:  Chronic


Assessment & Plan:  She had been on diuretics, amlodipine and benazepril. Her 

diuretics were stopped due to hypercalcemia. Her amlodipine was stopped due to 

LE edema. Will monitor on the benazepril and modify as needed. 





(6) Anxiety


Status:  Chronic


Assessment & Plan:  She has been on chronic fluoxetine and alprazolam.





(7) Depression


Status:  Chronic


Assessment & Plan:  She has been on the fluoxetine.





(8) Hypothyroidism


Status:  Chronic


Assessment & Plan:  TSH is 0.55. Continue levothyroxine. Will switch to IV form 

now that she is intubated.





(9) CKD (chronic kidney disease), stage III


Status:  Chronic


Assessment & Plan:  Creatinine was slightly elevated at 1.1 at the time of 

admission. Monitor.





(10) Hyperlipidemia


Status:  Chronic


Assessment & Plan:  She is not currently on treatment. Dr. Davis is following.








Exam


Sepsis Risk:  No Definite Risk











ANDRE RODRIGUEZ MD Apr 7, 2018 05:11

## 2018-04-07 NOTE — MISCELLANEOUS PROVIDER NOTE
Miscellaneous Provider Note


Note


Called by nursing staff. Patient had 300cc dark red blood from NG on 

repositioning. Repeat CBC ordered and Hgb noted to drop from 13.3 to 11. 

Patient typed and crossed for PRBCs and FFP. Lovenox stopped. Protonix and 

Carafate ordered. Her VSS currently. Her son was notified of change in status 

and gave permission for blood products. He notes she does have past history of 

peptic ulcer disease. Continue to monitor closely in ICU. Surgery notified to 

know about the patient and will officially consult if needed.











GABBY RODRIGUEZ MD Apr 7, 2018 21:11

## 2018-04-07 NOTE — ER INPATIENT PROCEDURE
Inpatient Procedure


Responded to code blue.


Patient was found down in the bathroom after having a rapid heart rate noted by 

nurse.  CPR was initiated by nursing staff and in progress on my arrival to the 

floor.  Patient was ashen colored on the bathroom floor.  Patient was removed 

from the bathroom and placed on the bed.  At that time.  There seemed to be 

some purposeful movement in her arms and legs.  I asked the nurses to check for 

a pulse and a pulse was found.  Patient was then placed on 100% nonrebreather 

mask.  And she was bagged with assistance to her spontaneous respirations.  Her 

saturations enzo slowly from in the low 20s up to 90%.  She was placed on the 

cardiac monitor, noted to have a rhythm in the 140s.  Blood pressure was 

performed and noted to be 156/96.  Patient was suctioned out.  She continued to 

have a spontaneous rhythm that enzo to the 180s almost 200s.  She remained 

unresponsive.  Patient was given Versed 2 mg and succinylcholine 120 mg IV 

push.  She was then intubated with a #7.5 ET tube.  On the 3rd attempt.  Good 

placement was noted with good color change and vapor return in the tube.  Good 

lung sounds were auscultated bilaterally.  A portable chest x-ray was performed 

which showed good placement of the ET tube.  Care was turned over to Dr. Filiberto Graves.











VICENTE DELA CRUZ DO Apr 7, 2018 04:37

## 2018-04-07 NOTE — EKG
FACILITY: [facility] - [place] 

 

PATIENT NAME: CARLINE JACKSON

: [id number]

MR: [id number]

V: [id number]

EXAM DATE: 

ORDERING PHYSICIAN: ANDRE RODRIGUEZ

TECHNOLOGIST: MARTINA

 

Test Reason : S/P SVT

Blood Pressure : ***/*** mmHG

Vent. Rate : 078 BPM     Atrial Rate : 078 BPM

   P-R Int : 146 ms          QRS Dur : 096 ms

    QT Int : 402 ms       P-R-T Axes : 072 -32 085 degrees

   QTc Int : 458 ms

 

Normal sinus rhythm

Left axis deviation

Nonspecific ST and T wave abnormality

Abnormal ECG

When compared with ECG of 2018 04:45,

Vent. rate has decreased BY  74 BPM

ST no longer depressed in Anterior leads

Confirmed by GABBY CORRAL ([id number]) on 2018 10:28:05 PM

 

Referred By:             Confirmed By:GABBY CORRAL

## 2018-04-08 VITALS — DIASTOLIC BLOOD PRESSURE: 80 MMHG | SYSTOLIC BLOOD PRESSURE: 157 MMHG

## 2018-04-08 VITALS — SYSTOLIC BLOOD PRESSURE: 128 MMHG | DIASTOLIC BLOOD PRESSURE: 68 MMHG

## 2018-04-08 VITALS — DIASTOLIC BLOOD PRESSURE: 83 MMHG | SYSTOLIC BLOOD PRESSURE: 151 MMHG

## 2018-04-08 VITALS — DIASTOLIC BLOOD PRESSURE: 74 MMHG | SYSTOLIC BLOOD PRESSURE: 141 MMHG

## 2018-04-08 VITALS — DIASTOLIC BLOOD PRESSURE: 72 MMHG | SYSTOLIC BLOOD PRESSURE: 130 MMHG

## 2018-04-08 VITALS — DIASTOLIC BLOOD PRESSURE: 79 MMHG | SYSTOLIC BLOOD PRESSURE: 146 MMHG

## 2018-04-08 VITALS — DIASTOLIC BLOOD PRESSURE: 78 MMHG | SYSTOLIC BLOOD PRESSURE: 152 MMHG

## 2018-04-08 VITALS — DIASTOLIC BLOOD PRESSURE: 79 MMHG | SYSTOLIC BLOOD PRESSURE: 155 MMHG

## 2018-04-08 VITALS — SYSTOLIC BLOOD PRESSURE: 140 MMHG | DIASTOLIC BLOOD PRESSURE: 75 MMHG

## 2018-04-08 VITALS — SYSTOLIC BLOOD PRESSURE: 143 MMHG | DIASTOLIC BLOOD PRESSURE: 71 MMHG

## 2018-04-08 VITALS — DIASTOLIC BLOOD PRESSURE: 78 MMHG | SYSTOLIC BLOOD PRESSURE: 151 MMHG

## 2018-04-08 VITALS — DIASTOLIC BLOOD PRESSURE: 76 MMHG | SYSTOLIC BLOOD PRESSURE: 136 MMHG

## 2018-04-08 VITALS — DIASTOLIC BLOOD PRESSURE: 67 MMHG | SYSTOLIC BLOOD PRESSURE: 127 MMHG

## 2018-04-08 VITALS — SYSTOLIC BLOOD PRESSURE: 164 MMHG | DIASTOLIC BLOOD PRESSURE: 78 MMHG

## 2018-04-08 VITALS — SYSTOLIC BLOOD PRESSURE: 149 MMHG | DIASTOLIC BLOOD PRESSURE: 75 MMHG

## 2018-04-08 VITALS — SYSTOLIC BLOOD PRESSURE: 153 MMHG | DIASTOLIC BLOOD PRESSURE: 83 MMHG

## 2018-04-08 VITALS — SYSTOLIC BLOOD PRESSURE: 164 MMHG | DIASTOLIC BLOOD PRESSURE: 91 MMHG

## 2018-04-08 VITALS — DIASTOLIC BLOOD PRESSURE: 76 MMHG | SYSTOLIC BLOOD PRESSURE: 152 MMHG

## 2018-04-08 VITALS — DIASTOLIC BLOOD PRESSURE: 86 MMHG | SYSTOLIC BLOOD PRESSURE: 164 MMHG

## 2018-04-08 VITALS — SYSTOLIC BLOOD PRESSURE: 136 MMHG | DIASTOLIC BLOOD PRESSURE: 74 MMHG

## 2018-04-08 VITALS — DIASTOLIC BLOOD PRESSURE: 86 MMHG | SYSTOLIC BLOOD PRESSURE: 150 MMHG

## 2018-04-08 VITALS — DIASTOLIC BLOOD PRESSURE: 93 MMHG | SYSTOLIC BLOOD PRESSURE: 167 MMHG

## 2018-04-08 VITALS — DIASTOLIC BLOOD PRESSURE: 92 MMHG | SYSTOLIC BLOOD PRESSURE: 165 MMHG

## 2018-04-08 VITALS — DIASTOLIC BLOOD PRESSURE: 78 MMHG | SYSTOLIC BLOOD PRESSURE: 163 MMHG

## 2018-04-08 VITALS — SYSTOLIC BLOOD PRESSURE: 155 MMHG | DIASTOLIC BLOOD PRESSURE: 84 MMHG

## 2018-04-08 VITALS — DIASTOLIC BLOOD PRESSURE: 88 MMHG | SYSTOLIC BLOOD PRESSURE: 149 MMHG

## 2018-04-08 VITALS — DIASTOLIC BLOOD PRESSURE: 66 MMHG | SYSTOLIC BLOOD PRESSURE: 126 MMHG

## 2018-04-08 VITALS — SYSTOLIC BLOOD PRESSURE: 143 MMHG | DIASTOLIC BLOOD PRESSURE: 69 MMHG

## 2018-04-08 VITALS — SYSTOLIC BLOOD PRESSURE: 129 MMHG | DIASTOLIC BLOOD PRESSURE: 73 MMHG

## 2018-04-08 VITALS — SYSTOLIC BLOOD PRESSURE: 147 MMHG | DIASTOLIC BLOOD PRESSURE: 98 MMHG

## 2018-04-08 VITALS — DIASTOLIC BLOOD PRESSURE: 84 MMHG | SYSTOLIC BLOOD PRESSURE: 148 MMHG

## 2018-04-08 VITALS — DIASTOLIC BLOOD PRESSURE: 75 MMHG | SYSTOLIC BLOOD PRESSURE: 145 MMHG

## 2018-04-08 VITALS — DIASTOLIC BLOOD PRESSURE: 76 MMHG | SYSTOLIC BLOOD PRESSURE: 143 MMHG

## 2018-04-08 VITALS — SYSTOLIC BLOOD PRESSURE: 153 MMHG | DIASTOLIC BLOOD PRESSURE: 88 MMHG

## 2018-04-08 VITALS — DIASTOLIC BLOOD PRESSURE: 77 MMHG | SYSTOLIC BLOOD PRESSURE: 146 MMHG

## 2018-04-08 VITALS — DIASTOLIC BLOOD PRESSURE: 57 MMHG | SYSTOLIC BLOOD PRESSURE: 129 MMHG

## 2018-04-08 VITALS — DIASTOLIC BLOOD PRESSURE: 86 MMHG | SYSTOLIC BLOOD PRESSURE: 153 MMHG

## 2018-04-08 VITALS — SYSTOLIC BLOOD PRESSURE: 149 MMHG | DIASTOLIC BLOOD PRESSURE: 79 MMHG

## 2018-04-08 VITALS — SYSTOLIC BLOOD PRESSURE: 136 MMHG | DIASTOLIC BLOOD PRESSURE: 73 MMHG

## 2018-04-08 VITALS — SYSTOLIC BLOOD PRESSURE: 143 MMHG | DIASTOLIC BLOOD PRESSURE: 73 MMHG

## 2018-04-08 VITALS — DIASTOLIC BLOOD PRESSURE: 79 MMHG | SYSTOLIC BLOOD PRESSURE: 147 MMHG

## 2018-04-08 VITALS — DIASTOLIC BLOOD PRESSURE: 78 MMHG | SYSTOLIC BLOOD PRESSURE: 147 MMHG

## 2018-04-08 VITALS — SYSTOLIC BLOOD PRESSURE: 138 MMHG | DIASTOLIC BLOOD PRESSURE: 76 MMHG

## 2018-04-08 VITALS — DIASTOLIC BLOOD PRESSURE: 84 MMHG | SYSTOLIC BLOOD PRESSURE: 169 MMHG

## 2018-04-08 VITALS — SYSTOLIC BLOOD PRESSURE: 144 MMHG | DIASTOLIC BLOOD PRESSURE: 76 MMHG

## 2018-04-08 VITALS — SYSTOLIC BLOOD PRESSURE: 142 MMHG | DIASTOLIC BLOOD PRESSURE: 75 MMHG

## 2018-04-08 VITALS — DIASTOLIC BLOOD PRESSURE: 83 MMHG | SYSTOLIC BLOOD PRESSURE: 145 MMHG

## 2018-04-08 VITALS — SYSTOLIC BLOOD PRESSURE: 166 MMHG | DIASTOLIC BLOOD PRESSURE: 90 MMHG

## 2018-04-08 VITALS — SYSTOLIC BLOOD PRESSURE: 141 MMHG | DIASTOLIC BLOOD PRESSURE: 74 MMHG

## 2018-04-08 LAB
PLATELET COUNT, AUTOMATED: 160 K/UL (ref 150–450)
PLATELET COUNT, AUTOMATED: 166 K/UL (ref 150–450)

## 2018-04-08 RX ADMIN — THIAMINE HYDROCHLORIDE PRN MLS/HR: 100 INJECTION, SOLUTION INTRAMUSCULAR; INTRAVENOUS at 23:07

## 2018-04-08 RX ADMIN — PROPOFOL PRN MLS/HR: 10 INJECTION, EMULSION INTRAVENOUS at 08:37

## 2018-04-08 RX ADMIN — SUCRALFATE SCH GM: 1 TABLET ORAL at 08:21

## 2018-04-08 RX ADMIN — LEVALBUTEROL HYDROCHLORIDE SCH MG: 1.25 SOLUTION RESPIRATORY (INHALATION) at 09:53

## 2018-04-08 RX ADMIN — LEVOTHYROXINE SODIUM ANHYDROUS SCH MCG: 100 INJECTION, POWDER, LYOPHILIZED, FOR SOLUTION INTRAVENOUS at 09:36

## 2018-04-08 RX ADMIN — ACETAMINOPHEN SCH MLS/HR: 10 INJECTION, SOLUTION INTRAVENOUS at 11:42

## 2018-04-08 RX ADMIN — THIAMINE HYDROCHLORIDE PRN MLS/HR: 100 INJECTION, SOLUTION INTRAMUSCULAR; INTRAVENOUS at 09:50

## 2018-04-08 RX ADMIN — PANTOPRAZOLE SODIUM SCH MG: 40 INJECTION, POWDER, FOR SOLUTION INTRAVENOUS at 20:15

## 2018-04-08 RX ADMIN — THIAMINE HYDROCHLORIDE PRN MLS/HR: 100 INJECTION, SOLUTION INTRAMUSCULAR; INTRAVENOUS at 18:56

## 2018-04-08 RX ADMIN — LEVALBUTEROL HYDROCHLORIDE SCH MG: 1.25 SOLUTION RESPIRATORY (INHALATION) at 18:18

## 2018-04-08 RX ADMIN — ACETAMINOPHEN SCH MLS/HR: 10 INJECTION, SOLUTION INTRAVENOUS at 23:07

## 2018-04-08 RX ADMIN — ACETAMINOPHEN SCH MLS/HR: 10 INJECTION, SOLUTION INTRAVENOUS at 05:21

## 2018-04-08 RX ADMIN — Medication SCH EACH: at 08:31

## 2018-04-08 RX ADMIN — SODIUM CHLORIDE SCH MG: 900 INJECTION, SOLUTION INTRAVENOUS at 17:23

## 2018-04-08 RX ADMIN — ACETAMINOPHEN SCH MLS/HR: 10 INJECTION, SOLUTION INTRAVENOUS at 16:46

## 2018-04-08 RX ADMIN — PROPOFOL PRN MLS/HR: 10 INJECTION, EMULSION INTRAVENOUS at 03:36

## 2018-04-08 RX ADMIN — LEVALBUTEROL HYDROCHLORIDE SCH MG: 1.25 SOLUTION RESPIRATORY (INHALATION) at 06:00

## 2018-04-08 RX ADMIN — PANTOPRAZOLE SODIUM SCH MG: 40 INJECTION, POWDER, FOR SOLUTION INTRAVENOUS at 08:30

## 2018-04-08 RX ADMIN — DILTIAZEM HYDROCHLORIDE SCH MLS/HR: 100 INJECTION, POWDER, LYOPHILIZED, FOR SOLUTION INTRAVENOUS at 21:26

## 2018-04-08 RX ADMIN — SUCRALFATE SCH GM: 1 TABLET ORAL at 14:15

## 2018-04-08 RX ADMIN — PROPOFOL PRN MLS/HR: 10 INJECTION, EMULSION INTRAVENOUS at 15:13

## 2018-04-08 RX ADMIN — PROPOFOL PRN MLS/HR: 10 INJECTION, EMULSION INTRAVENOUS at 21:27

## 2018-04-08 RX ADMIN — SUCRALFATE SCH GM: 1 TABLET ORAL at 20:14

## 2018-04-08 RX ADMIN — SUCRALFATE SCH GM: 1 TABLET ORAL at 02:06

## 2018-04-08 RX ADMIN — SODIUM CHLORIDE SCH MG: 900 INJECTION, SOLUTION INTRAVENOUS at 05:21

## 2018-04-08 RX ADMIN — Medication SCH EACH: at 20:16

## 2018-04-08 RX ADMIN — LEVALBUTEROL HYDROCHLORIDE SCH MG: 1.25 SOLUTION RESPIRATORY (INHALATION) at 14:53

## 2018-04-08 RX ADMIN — PROPOFOL PRN MLS/HR: 10 INJECTION, EMULSION INTRAVENOUS at 21:32

## 2018-04-08 NOTE — RADIOLOGY IMAGING REPORT
[place] [facility] [place]IENT NAME: Danica Zapata

: 1940

MR: [id number]

V: [id number]

EXAM DATE: 

ORDERING PHYSICIAN: GABBY RODRIGUEZ

TECHNOLOGIST: 

 

Location: [facility]

Patient: Danica Zapata

: 1940

MRN: [id number]

Visit/Account:[id number]

Date of Sevice:  2018

 

ACCESSION #: [id number]

 

Portable chest:

 

Indication: Respiratory insufficiency.

Technique: A single frontal film was obtained.

Comparison: 2018

 

Lines and tubes: Remain in satisfactory position.

Skeletal and soft tissue structures: Intact and unchanged.

Heart and mediastinum: Stable.

Lung fields: Unchanged, allowing for technical differences.

Pleural spaces: No evidence of pneumothorax or significant effusion.

 

Impression: No acute interval change.

 

Report Dictated By: Art Reyes MD at 2018 6:27 AM

 

Report E-Signed By: Art Reyes MD  at 2018 6:28 AM

 

WSN:[id number]

## 2018-04-08 NOTE — HOSPITALIST PROGRESS NOTE
Subjective


Progress Notes


Subjective


This patient has remained in the ICU secondary to COPD and pulseless arrest.  

She had no acute events overnight.





Patient Complains of:


Cardiovascular:  No: Chest Pain


Respiratory:  No: Shortness of Breath





Physical Exam





Vital Signs








  Date Time  Temp Pulse Resp B/P (MAP) Pulse Ox O2 Delivery O2 Flow Rate FiO2


 


4/8/18 07:59        45.0


 


4/8/18 06:30  61 18 140/75 (96) 93 Mechanical Ventilator  


 


4/8/18 04:00 97.2       


 


4/7/18 03:29       2.0 








Neuro:  No Gross deficits (Sedated to RASS -3.)


Eyes:  PERRLA


Cardiovascular:  Regular Rate and Rhythm


Respiratory:  Other (Bilateral breath sounds present.)


GI:  Soft and Non-Tender


Extremities:  No Edema


Integumentary:  No Cyanosis


Result Diagram:  


4/8/18 0510 4/8/18 0510














Item Value  Date Time


 


Arterial Blood pH 7.43 4/8/18 0513


 


Arterial Blood Partial Pressure CO2 42 mmHg H 4/8/18 0513


 


Arterial Blood Partial Pressure O2 75 mmHg 4/8/18 0513


 


Arterial Blood HCO3 28 mmol/L H 4/8/18 0513








Imaging


Chest x-ray reviewed.





Assessment and Plan


Problems:  


(1) COPD with exacerbation


Status:  Acute


Assessment & Plan:  She does have a history of severe COPD and presented with 

increased shortness of breath.  Her Chest x-rays have been negative for an 

infiltrate.  She is on treatment with nebulizers, IV corticosteroids, and 

ceftriaxone.





(2) Acute respiratory failure


Assessment & Plan:  She was intubated on 4/7/18 during a code.  She is 

receiving versed and propofol for sedation.  This morning she is not 

overbreathing the ventilator.  We will plan to wean her sedation and perform 

CPAP trials through today.





(3) Cardiorespiratory arrest


Status:  Acute


Assessment & Plan:  She did suffer a pulseless arrest on 4/7/18.  This was 

likely secondary to SVT.





(4) Supraventricular tachycardia


Status:  Acute


Assessment & Plan:  She was noted to be in a narrow complex tachycardia during 

her code event.  She converted to sinus rhythm after receiving adenosine.  Her 

rate has been  normal since.  





(5) Hypertension


Status:  Chronic


Assessment & Plan:  She had been on diuretics, amlodipine and benazepril. These 

have all been on hold and her blood pressures are only mildly elevated.





(6) Anxiety


Status:  Chronic


Assessment & Plan:  She has been on chronic fluoxetine and alprazolam.





(7) Depression


Status:  Chronic


Assessment & Plan:  She has been on the fluoxetine.





(8) Hypothyroidism


Status:  Chronic


Assessment & Plan:  She is on chronic treatment with Synthroid, which is 

currently being administered IV.





(9) CKD (chronic kidney disease), stage III


Status:  Chronic


(10) Elevated troponin


Assessment & Plan:  She has had an elevated troponin, but these have not 

trended in a pattern consistent with infarction.  It is likely secondary to her 

chronic kidney disease.





(11) Acute systolic right heart failure


Assessment & Plan:  She did have an elevated BNP.  Her echocardiogram is 

reported to show a preserved ejection fraction, but pulmonary hypertension.   

We will be giving her a dose of Lasix today.  Daily weights are ordered.








Exam


Sepsis Risk:  No Definite Risk





Problem Qualifiers





(1) Hypertension:  


Hypertension type:  essential hypertension  Qualified Codes:  I10 - Essential (

primary) hypertension








RUPESH MAYEN DO Apr 8, 2018 09:20

## 2018-04-09 VITALS — SYSTOLIC BLOOD PRESSURE: 151 MMHG | DIASTOLIC BLOOD PRESSURE: 80 MMHG

## 2018-04-09 VITALS — SYSTOLIC BLOOD PRESSURE: 168 MMHG | DIASTOLIC BLOOD PRESSURE: 88 MMHG

## 2018-04-09 VITALS — DIASTOLIC BLOOD PRESSURE: 76 MMHG | SYSTOLIC BLOOD PRESSURE: 145 MMHG

## 2018-04-09 VITALS — SYSTOLIC BLOOD PRESSURE: 153 MMHG | DIASTOLIC BLOOD PRESSURE: 79 MMHG

## 2018-04-09 VITALS — DIASTOLIC BLOOD PRESSURE: 87 MMHG | SYSTOLIC BLOOD PRESSURE: 157 MMHG

## 2018-04-09 VITALS — DIASTOLIC BLOOD PRESSURE: 97 MMHG | SYSTOLIC BLOOD PRESSURE: 176 MMHG

## 2018-04-09 VITALS — SYSTOLIC BLOOD PRESSURE: 171 MMHG | DIASTOLIC BLOOD PRESSURE: 93 MMHG

## 2018-04-09 VITALS — DIASTOLIC BLOOD PRESSURE: 71 MMHG | SYSTOLIC BLOOD PRESSURE: 138 MMHG

## 2018-04-09 VITALS — SYSTOLIC BLOOD PRESSURE: 171 MMHG | DIASTOLIC BLOOD PRESSURE: 92 MMHG

## 2018-04-09 VITALS — DIASTOLIC BLOOD PRESSURE: 85 MMHG | SYSTOLIC BLOOD PRESSURE: 164 MMHG

## 2018-04-09 VITALS — DIASTOLIC BLOOD PRESSURE: 82 MMHG | SYSTOLIC BLOOD PRESSURE: 152 MMHG

## 2018-04-09 VITALS — SYSTOLIC BLOOD PRESSURE: 180 MMHG | DIASTOLIC BLOOD PRESSURE: 104 MMHG

## 2018-04-09 VITALS — DIASTOLIC BLOOD PRESSURE: 75 MMHG | SYSTOLIC BLOOD PRESSURE: 149 MMHG

## 2018-04-09 VITALS — SYSTOLIC BLOOD PRESSURE: 146 MMHG | DIASTOLIC BLOOD PRESSURE: 69 MMHG

## 2018-04-09 VITALS — DIASTOLIC BLOOD PRESSURE: 81 MMHG | SYSTOLIC BLOOD PRESSURE: 157 MMHG

## 2018-04-09 VITALS — SYSTOLIC BLOOD PRESSURE: 150 MMHG | DIASTOLIC BLOOD PRESSURE: 73 MMHG

## 2018-04-09 VITALS — SYSTOLIC BLOOD PRESSURE: 154 MMHG | DIASTOLIC BLOOD PRESSURE: 78 MMHG

## 2018-04-09 VITALS — DIASTOLIC BLOOD PRESSURE: 95 MMHG | SYSTOLIC BLOOD PRESSURE: 178 MMHG

## 2018-04-09 VITALS — SYSTOLIC BLOOD PRESSURE: 175 MMHG | DIASTOLIC BLOOD PRESSURE: 94 MMHG

## 2018-04-09 VITALS — SYSTOLIC BLOOD PRESSURE: 173 MMHG | DIASTOLIC BLOOD PRESSURE: 90 MMHG

## 2018-04-09 VITALS — SYSTOLIC BLOOD PRESSURE: 176 MMHG | DIASTOLIC BLOOD PRESSURE: 88 MMHG

## 2018-04-09 VITALS — DIASTOLIC BLOOD PRESSURE: 73 MMHG | SYSTOLIC BLOOD PRESSURE: 143 MMHG

## 2018-04-09 VITALS — DIASTOLIC BLOOD PRESSURE: 92 MMHG | SYSTOLIC BLOOD PRESSURE: 172 MMHG

## 2018-04-09 VITALS — DIASTOLIC BLOOD PRESSURE: 107 MMHG | SYSTOLIC BLOOD PRESSURE: 173 MMHG

## 2018-04-09 VITALS — SYSTOLIC BLOOD PRESSURE: 152 MMHG | DIASTOLIC BLOOD PRESSURE: 77 MMHG

## 2018-04-09 VITALS — SYSTOLIC BLOOD PRESSURE: 169 MMHG | DIASTOLIC BLOOD PRESSURE: 87 MMHG

## 2018-04-09 VITALS — SYSTOLIC BLOOD PRESSURE: 170 MMHG | DIASTOLIC BLOOD PRESSURE: 90 MMHG

## 2018-04-09 VITALS — SYSTOLIC BLOOD PRESSURE: 190 MMHG | DIASTOLIC BLOOD PRESSURE: 98 MMHG

## 2018-04-09 VITALS — SYSTOLIC BLOOD PRESSURE: 168 MMHG | DIASTOLIC BLOOD PRESSURE: 92 MMHG

## 2018-04-09 VITALS — DIASTOLIC BLOOD PRESSURE: 82 MMHG | SYSTOLIC BLOOD PRESSURE: 159 MMHG

## 2018-04-09 VITALS — DIASTOLIC BLOOD PRESSURE: 78 MMHG | SYSTOLIC BLOOD PRESSURE: 149 MMHG

## 2018-04-09 VITALS — SYSTOLIC BLOOD PRESSURE: 188 MMHG | DIASTOLIC BLOOD PRESSURE: 99 MMHG

## 2018-04-09 VITALS — SYSTOLIC BLOOD PRESSURE: 173 MMHG | DIASTOLIC BLOOD PRESSURE: 88 MMHG

## 2018-04-09 VITALS — DIASTOLIC BLOOD PRESSURE: 101 MMHG | SYSTOLIC BLOOD PRESSURE: 180 MMHG

## 2018-04-09 VITALS — DIASTOLIC BLOOD PRESSURE: 90 MMHG | SYSTOLIC BLOOD PRESSURE: 162 MMHG

## 2018-04-09 VITALS — DIASTOLIC BLOOD PRESSURE: 73 MMHG | SYSTOLIC BLOOD PRESSURE: 140 MMHG

## 2018-04-09 VITALS — SYSTOLIC BLOOD PRESSURE: 173 MMHG | DIASTOLIC BLOOD PRESSURE: 99 MMHG

## 2018-04-09 VITALS — DIASTOLIC BLOOD PRESSURE: 88 MMHG | SYSTOLIC BLOOD PRESSURE: 171 MMHG

## 2018-04-09 VITALS — SYSTOLIC BLOOD PRESSURE: 176 MMHG | DIASTOLIC BLOOD PRESSURE: 97 MMHG

## 2018-04-09 VITALS — SYSTOLIC BLOOD PRESSURE: 153 MMHG | DIASTOLIC BLOOD PRESSURE: 78 MMHG

## 2018-04-09 VITALS — SYSTOLIC BLOOD PRESSURE: 173 MMHG | DIASTOLIC BLOOD PRESSURE: 96 MMHG

## 2018-04-09 VITALS — DIASTOLIC BLOOD PRESSURE: 103 MMHG | SYSTOLIC BLOOD PRESSURE: 170 MMHG

## 2018-04-09 VITALS — DIASTOLIC BLOOD PRESSURE: 86 MMHG | SYSTOLIC BLOOD PRESSURE: 166 MMHG

## 2018-04-09 VITALS — DIASTOLIC BLOOD PRESSURE: 126 MMHG | SYSTOLIC BLOOD PRESSURE: 191 MMHG

## 2018-04-09 VITALS — SYSTOLIC BLOOD PRESSURE: 161 MMHG | DIASTOLIC BLOOD PRESSURE: 81 MMHG

## 2018-04-09 VITALS — SYSTOLIC BLOOD PRESSURE: 154 MMHG | DIASTOLIC BLOOD PRESSURE: 118 MMHG

## 2018-04-09 VITALS — SYSTOLIC BLOOD PRESSURE: 140 MMHG | DIASTOLIC BLOOD PRESSURE: 70 MMHG

## 2018-04-09 VITALS — DIASTOLIC BLOOD PRESSURE: 83 MMHG | SYSTOLIC BLOOD PRESSURE: 159 MMHG

## 2018-04-09 LAB — PLATELET COUNT, AUTOMATED: 168 K/UL (ref 150–450)

## 2018-04-09 RX ADMIN — SUCRALFATE SCH GM: 1 TABLET ORAL at 08:49

## 2018-04-09 RX ADMIN — LEVALBUTEROL HYDROCHLORIDE SCH MG: 1.25 SOLUTION RESPIRATORY (INHALATION) at 09:03

## 2018-04-09 RX ADMIN — PANTOPRAZOLE SODIUM SCH MG: 40 INJECTION, POWDER, FOR SOLUTION INTRAVENOUS at 20:36

## 2018-04-09 RX ADMIN — ACETAMINOPHEN SCH MLS/HR: 10 INJECTION, SOLUTION INTRAVENOUS at 05:38

## 2018-04-09 RX ADMIN — PANTOPRAZOLE SODIUM SCH MG: 40 INJECTION, POWDER, FOR SOLUTION INTRAVENOUS at 08:49

## 2018-04-09 RX ADMIN — SUCRALFATE SCH GM: 1 TABLET ORAL at 13:38

## 2018-04-09 RX ADMIN — Medication PRN MG: at 07:49

## 2018-04-09 RX ADMIN — SUCRALFATE SCH GM: 1 TABLET ORAL at 02:17

## 2018-04-09 RX ADMIN — ACETAMINOPHEN SCH MLS/HR: 10 INJECTION, SOLUTION INTRAVENOUS at 23:03

## 2018-04-09 RX ADMIN — PROPOFOL PRN MLS/HR: 10 INJECTION, EMULSION INTRAVENOUS at 03:33

## 2018-04-09 RX ADMIN — SODIUM CHLORIDE SCH MG: 900 INJECTION, SOLUTION INTRAVENOUS at 05:38

## 2018-04-09 RX ADMIN — SUCRALFATE SCH GM: 1 TABLET ORAL at 20:36

## 2018-04-09 RX ADMIN — THIAMINE HYDROCHLORIDE PRN MLS/HR: 100 INJECTION, SOLUTION INTRAMUSCULAR; INTRAVENOUS at 08:54

## 2018-04-09 RX ADMIN — ACETAMINOPHEN SCH MLS/HR: 10 INJECTION, SOLUTION INTRAVENOUS at 17:20

## 2018-04-09 RX ADMIN — LEVALBUTEROL HYDROCHLORIDE SCH MG: 1.25 SOLUTION RESPIRATORY (INHALATION) at 13:48

## 2018-04-09 RX ADMIN — DILTIAZEM HYDROCHLORIDE SCH MLS/HR: 100 INJECTION, POWDER, LYOPHILIZED, FOR SOLUTION INTRAVENOUS at 13:04

## 2018-04-09 RX ADMIN — LIDOCAINE HYDROCHLORIDE SCH MLS/HR: 10 INJECTION, SOLUTION INFILTRATION; PERINEURAL at 05:39

## 2018-04-09 RX ADMIN — LEVALBUTEROL HYDROCHLORIDE SCH MG: 1.25 SOLUTION RESPIRATORY (INHALATION) at 17:03

## 2018-04-09 RX ADMIN — ACETAMINOPHEN SCH MLS/HR: 10 INJECTION, SOLUTION INTRAVENOUS at 12:05

## 2018-04-09 RX ADMIN — THIAMINE HYDROCHLORIDE PRN MLS/HR: 100 INJECTION, SOLUTION INTRAMUSCULAR; INTRAVENOUS at 13:39

## 2018-04-09 RX ADMIN — LEVALBUTEROL HYDROCHLORIDE SCH MG: 1.25 SOLUTION RESPIRATORY (INHALATION) at 05:21

## 2018-04-09 RX ADMIN — SODIUM CHLORIDE SCH MG: 900 INJECTION, SOLUTION INTRAVENOUS at 17:16

## 2018-04-09 RX ADMIN — LEVOTHYROXINE SODIUM ANHYDROUS SCH MCG: 100 INJECTION, POWDER, LYOPHILIZED, FOR SOLUTION INTRAVENOUS at 09:57

## 2018-04-09 NOTE — RADIOLOGY IMAGING REPORT
FACILITY: [facility] - [place], [place] 

 

PATIENT NAME: CARLINE JACKSON

: [id number]

MR: [id number]

V: [id number]

EXAM DATE: 

ORDERING PHYSICIAN: GABBY RODRIGUEZ

TECHNOLOGIST: Samantha Yeung

 

EXAMINATION:TWO-DIMENSIONAL ECHOCARDIOGRAPH

REASON:ELEVATED BNP/POSSIBLE PULMONARY HTN

 

2D Measurements (normal values in centimeters)

LV endLV endRV endVent.LV PostAorticLeftPercent

DiastolicSystolicDiastolicSeptumWallRootAtriumShortening

(3.5-5.7)(0.9-2.6)(0.6-1.1)(0.6-1.1)(2.0-3.7)(1.9-4.0)(25-35%)

[id number]%

 

STROKE VOLUME:  60.7ml

ESTIMATED EJECTION FRACTION:55%

 

PARASTERNAL LONG AXIS:

Overall left ventricular systolic function does appear to be normal. 

Right ventricle appears to be the upper range of normal in size if not 

slightly enlarged. The aortic valve & mitral valve both appear to 

open normally. The patient appears to be in sinus rhythm. No wall 

motion abnormalities are noted. Right ventricular function appears to 

be normal. The TAPSE measured at 2.5. Mild mitral annular calcification 

is noted. Color examination of the valves reveals some mitral 

insufficiency as well as a trace of aortic insufficiency present. There 

is also some tricuspid insufficiency.

 

PARASTERNAL SHORT AXIS: 

Overall left ventricular systolic function again appears to be normal. 

Right ventricle appears to be borderline enlarged if not slightly 

enlarged. The aortic valve is not well seen but it is probably 

trileaflet in configuration with some aortic insufficiency noted.

 

APICAL FOUR AND TWO CHAMBER: 

Normal left ventricular ejection fraction. Aortic valve area & mitral 

valve area both measure within normal ranges at 2.5 & 3.4cm2 

respectively. The tricuspid regurgitation Vmax measured 3.54m/sec. 

Estimated right atrial pressures of 3mm Hg giving a total right atrial 

pressure of 53mm Hg. Left atrial volume is the upper range of normal in 

size. Right atrial volume is also the upper range of normal in size. 

Mild left ventricular thickening is noted. No evidence for any outflow 

tract obstruction. Mild amount of tricuspid insufficiency is noted. 

There is a moderate amount of mitral insufficiency noted. View is 

somewhat directed toward the free wall of the left atrium. There is 

mitral annular calcification. Mitral valve area measured within normal 

ranges at 3.4cm2 respectively. Aortic insufficiency is also noted.

 

SUBCOSTAL VIEW: 

No pericardial effusion was noted. No atrioseptal or ventriculoseptal 

defects were appreciated. Doppler examination of the mitral valve in 

diastole does reveal the A wave > E wave indicates that there is 

reversal with Valsalva maneuver suggesting decreased diastolic 

function. Aortic insufficiency pressure half time was measured 464msec.

 

 

 

 

OVERALL IMPRESSION: 

1. Normal left ventricular ejection fraction of 55% with a moderate 

decrease in diastolic function.

2. Borderline to mildly enlarged right ventricle with the other chamber 

sizes being normal. The left atrial & right atrial volumes are 

measured the upper range of normal.

3. A trileaflet aortic valve with no aortic stenosis but a moderate 

amount of aortic insufficiency.

4. Mild mitral annular calcification but no stenosis & a moderate 

amount of mitral insufficiency.

5. A mild amount of tricuspid insufficiency with estimated right 

systolic pressures at 53mm Hg which does include an estimated right 

atrial pressure of 3mm Hg indicating moderate pulmonary hypertension 

& increased right ventricular systolic pressures.

6. A trace of pulmonic insufficiency.

7. Mild left ventricular thickening but no evidence for any outflow 

tract obstruction.

 

 

 

 

 

 

 

 

 

 

 

Dictated by: TREMAYNE Chavarria M.D. on 2018 at 10:29   

Transcribed by: JACQUE on 2018 at 11:15    

Approved by: TREMAYNE Chavarria M.D. on 2018 at 14:03   

[facility]

## 2018-04-09 NOTE — RADIOLOGY IMAGING REPORT
FACILITY: [facility] - [place] 

 

PATIENT NAME: Danica Zapata

: 1940

MR: [id number]

V: [id number]

EXAM DATE: 

ORDERING PHYSICIAN: RUPESH MAYEN

TECHNOLOGIST: 

 

Location: [facility]

Patient: Danica Zapata

: 1940

MRN: [id number]

Visit/Account:[id number]

Date of Sevice:  2018

 

ACCESSION #: [id number]

 

Portable chest:

 

Indication: Respiratory insufficiency.

Technique: A single frontal film was obtained.

Comparison: 2018

 

Lines and tubes: Remain in satisfactory position.

Skeletal and soft tissue structures: Intact and unchanged.

Heart and mediastinum: Stable.

Lung fields: Persistent right basilar opacity, without significant change. No new focal findings.

Pleural spaces: No evidence of pneumothorax

 

Impression: No significant change.

 

Report Dictated By: Art Reyes MD at 2018 6:20 AM

 

Report E-Signed By: Art Reyes MD  at 2018 6:21 AM

 

WSN:[id number]

## 2018-04-10 VITALS — DIASTOLIC BLOOD PRESSURE: 86 MMHG | SYSTOLIC BLOOD PRESSURE: 177 MMHG

## 2018-04-10 VITALS — DIASTOLIC BLOOD PRESSURE: 82 MMHG | SYSTOLIC BLOOD PRESSURE: 166 MMHG

## 2018-04-10 VITALS — SYSTOLIC BLOOD PRESSURE: 180 MMHG | DIASTOLIC BLOOD PRESSURE: 96 MMHG

## 2018-04-10 VITALS — DIASTOLIC BLOOD PRESSURE: 85 MMHG | SYSTOLIC BLOOD PRESSURE: 165 MMHG

## 2018-04-10 VITALS — SYSTOLIC BLOOD PRESSURE: 168 MMHG | DIASTOLIC BLOOD PRESSURE: 84 MMHG

## 2018-04-10 VITALS — DIASTOLIC BLOOD PRESSURE: 93 MMHG | SYSTOLIC BLOOD PRESSURE: 172 MMHG

## 2018-04-10 VITALS — DIASTOLIC BLOOD PRESSURE: 84 MMHG | SYSTOLIC BLOOD PRESSURE: 162 MMHG

## 2018-04-10 VITALS — DIASTOLIC BLOOD PRESSURE: 81 MMHG | SYSTOLIC BLOOD PRESSURE: 161 MMHG

## 2018-04-10 VITALS — SYSTOLIC BLOOD PRESSURE: 186 MMHG | DIASTOLIC BLOOD PRESSURE: 95 MMHG

## 2018-04-10 VITALS — SYSTOLIC BLOOD PRESSURE: 176 MMHG | DIASTOLIC BLOOD PRESSURE: 96 MMHG

## 2018-04-10 VITALS — DIASTOLIC BLOOD PRESSURE: 131 MMHG | SYSTOLIC BLOOD PRESSURE: 190 MMHG

## 2018-04-10 VITALS — SYSTOLIC BLOOD PRESSURE: 202 MMHG | DIASTOLIC BLOOD PRESSURE: 99 MMHG

## 2018-04-10 VITALS — SYSTOLIC BLOOD PRESSURE: 175 MMHG | DIASTOLIC BLOOD PRESSURE: 84 MMHG

## 2018-04-10 VITALS — DIASTOLIC BLOOD PRESSURE: 93 MMHG | SYSTOLIC BLOOD PRESSURE: 166 MMHG

## 2018-04-10 VITALS — SYSTOLIC BLOOD PRESSURE: 191 MMHG | DIASTOLIC BLOOD PRESSURE: 105 MMHG

## 2018-04-10 VITALS — DIASTOLIC BLOOD PRESSURE: 93 MMHG | SYSTOLIC BLOOD PRESSURE: 180 MMHG

## 2018-04-10 VITALS — DIASTOLIC BLOOD PRESSURE: 82 MMHG | SYSTOLIC BLOOD PRESSURE: 160 MMHG

## 2018-04-10 VITALS — DIASTOLIC BLOOD PRESSURE: 93 MMHG | SYSTOLIC BLOOD PRESSURE: 175 MMHG

## 2018-04-10 VITALS — SYSTOLIC BLOOD PRESSURE: 172 MMHG | DIASTOLIC BLOOD PRESSURE: 96 MMHG

## 2018-04-10 VITALS — DIASTOLIC BLOOD PRESSURE: 81 MMHG | SYSTOLIC BLOOD PRESSURE: 165 MMHG

## 2018-04-10 VITALS — SYSTOLIC BLOOD PRESSURE: 186 MMHG | DIASTOLIC BLOOD PRESSURE: 97 MMHG

## 2018-04-10 VITALS — DIASTOLIC BLOOD PRESSURE: 77 MMHG | SYSTOLIC BLOOD PRESSURE: 149 MMHG

## 2018-04-10 VITALS — DIASTOLIC BLOOD PRESSURE: 99 MMHG | SYSTOLIC BLOOD PRESSURE: 182 MMHG

## 2018-04-10 VITALS — DIASTOLIC BLOOD PRESSURE: 91 MMHG | SYSTOLIC BLOOD PRESSURE: 173 MMHG

## 2018-04-10 VITALS — SYSTOLIC BLOOD PRESSURE: 175 MMHG | DIASTOLIC BLOOD PRESSURE: 109 MMHG

## 2018-04-10 VITALS — DIASTOLIC BLOOD PRESSURE: 117 MMHG | SYSTOLIC BLOOD PRESSURE: 157 MMHG

## 2018-04-10 VITALS — DIASTOLIC BLOOD PRESSURE: 96 MMHG | SYSTOLIC BLOOD PRESSURE: 175 MMHG

## 2018-04-10 VITALS — SYSTOLIC BLOOD PRESSURE: 180 MMHG | DIASTOLIC BLOOD PRESSURE: 94 MMHG

## 2018-04-10 VITALS — DIASTOLIC BLOOD PRESSURE: 76 MMHG | SYSTOLIC BLOOD PRESSURE: 153 MMHG

## 2018-04-10 VITALS — DIASTOLIC BLOOD PRESSURE: 82 MMHG | SYSTOLIC BLOOD PRESSURE: 163 MMHG

## 2018-04-10 VITALS — DIASTOLIC BLOOD PRESSURE: 96 MMHG | SYSTOLIC BLOOD PRESSURE: 186 MMHG

## 2018-04-10 VITALS — SYSTOLIC BLOOD PRESSURE: 170 MMHG | DIASTOLIC BLOOD PRESSURE: 88 MMHG

## 2018-04-10 VITALS — DIASTOLIC BLOOD PRESSURE: 90 MMHG | SYSTOLIC BLOOD PRESSURE: 163 MMHG

## 2018-04-10 VITALS — DIASTOLIC BLOOD PRESSURE: 88 MMHG | SYSTOLIC BLOOD PRESSURE: 169 MMHG

## 2018-04-10 VITALS — DIASTOLIC BLOOD PRESSURE: 87 MMHG | SYSTOLIC BLOOD PRESSURE: 173 MMHG

## 2018-04-10 VITALS — DIASTOLIC BLOOD PRESSURE: 87 MMHG | SYSTOLIC BLOOD PRESSURE: 163 MMHG

## 2018-04-10 VITALS — DIASTOLIC BLOOD PRESSURE: 78 MMHG | SYSTOLIC BLOOD PRESSURE: 161 MMHG

## 2018-04-10 VITALS — DIASTOLIC BLOOD PRESSURE: 85 MMHG | SYSTOLIC BLOOD PRESSURE: 153 MMHG

## 2018-04-10 VITALS — SYSTOLIC BLOOD PRESSURE: 167 MMHG | DIASTOLIC BLOOD PRESSURE: 86 MMHG

## 2018-04-10 VITALS — SYSTOLIC BLOOD PRESSURE: 162 MMHG | DIASTOLIC BLOOD PRESSURE: 88 MMHG

## 2018-04-10 VITALS — DIASTOLIC BLOOD PRESSURE: 78 MMHG | SYSTOLIC BLOOD PRESSURE: 160 MMHG

## 2018-04-10 VITALS — DIASTOLIC BLOOD PRESSURE: 97 MMHG | SYSTOLIC BLOOD PRESSURE: 169 MMHG

## 2018-04-10 VITALS — SYSTOLIC BLOOD PRESSURE: 157 MMHG | DIASTOLIC BLOOD PRESSURE: 77 MMHG

## 2018-04-10 VITALS — SYSTOLIC BLOOD PRESSURE: 168 MMHG | DIASTOLIC BLOOD PRESSURE: 94 MMHG

## 2018-04-10 VITALS — SYSTOLIC BLOOD PRESSURE: 187 MMHG | DIASTOLIC BLOOD PRESSURE: 97 MMHG

## 2018-04-10 LAB — PLATELET COUNT, AUTOMATED: 232 K/UL (ref 150–450)

## 2018-04-10 RX ADMIN — PANTOPRAZOLE SODIUM SCH MG: 40 INJECTION, POWDER, FOR SOLUTION INTRAVENOUS at 09:18

## 2018-04-10 RX ADMIN — THIAMINE HYDROCHLORIDE PRN MLS/HR: 100 INJECTION, SOLUTION INTRAMUSCULAR; INTRAVENOUS at 07:47

## 2018-04-10 RX ADMIN — LEVALBUTEROL HYDROCHLORIDE SCH MG: 1.25 SOLUTION RESPIRATORY (INHALATION) at 06:03

## 2018-04-10 RX ADMIN — SUCRALFATE SCH GM: 1 TABLET ORAL at 17:27

## 2018-04-10 RX ADMIN — ACETAMINOPHEN SCH MLS/HR: 10 INJECTION, SOLUTION INTRAVENOUS at 05:10

## 2018-04-10 RX ADMIN — LEVALBUTEROL HYDROCHLORIDE SCH MG: 1.25 SOLUTION RESPIRATORY (INHALATION) at 17:11

## 2018-04-10 RX ADMIN — SODIUM CHLORIDE SCH MG: 900 INJECTION, SOLUTION INTRAVENOUS at 05:10

## 2018-04-10 RX ADMIN — SUCRALFATE SCH GM: 1 TABLET ORAL at 01:52

## 2018-04-10 RX ADMIN — ACETAMINOPHEN SCH MLS/HR: 10 INJECTION, SOLUTION INTRAVENOUS at 11:47

## 2018-04-10 RX ADMIN — SODIUM CHLORIDE SCH MG: 900 INJECTION, SOLUTION INTRAVENOUS at 17:27

## 2018-04-10 RX ADMIN — PANTOPRAZOLE SODIUM SCH MG: 40 INJECTION, POWDER, FOR SOLUTION INTRAVENOUS at 20:25

## 2018-04-10 RX ADMIN — SUCRALFATE SCH GM: 1 TABLET ORAL at 11:47

## 2018-04-10 RX ADMIN — SUCRALFATE SCH GM: 1 TABLET ORAL at 20:26

## 2018-04-10 RX ADMIN — LEVALBUTEROL HYDROCHLORIDE SCH MG: 1.25 SOLUTION RESPIRATORY (INHALATION) at 13:21

## 2018-04-10 RX ADMIN — LEVOTHYROXINE SODIUM ANHYDROUS SCH MCG: 100 INJECTION, POWDER, LYOPHILIZED, FOR SOLUTION INTRAVENOUS at 09:15

## 2018-04-10 RX ADMIN — THIAMINE HYDROCHLORIDE PRN MLS/HR: 100 INJECTION, SOLUTION INTRAMUSCULAR; INTRAVENOUS at 03:46

## 2018-04-10 RX ADMIN — THIAMINE HYDROCHLORIDE PRN MLS/HR: 100 INJECTION, SOLUTION INTRAMUSCULAR; INTRAVENOUS at 18:08

## 2018-04-10 RX ADMIN — LIDOCAINE HYDROCHLORIDE SCH MLS/HR: 10 INJECTION, SOLUTION INFILTRATION; PERINEURAL at 05:10

## 2018-04-10 RX ADMIN — LEVALBUTEROL HYDROCHLORIDE SCH MG: 1.25 SOLUTION RESPIRATORY (INHALATION) at 09:06

## 2018-04-10 RX ADMIN — ACETAMINOPHEN SCH MLS/HR: 10 INJECTION, SOLUTION INTRAVENOUS at 17:31

## 2018-04-10 RX ADMIN — ACETAMINOPHEN SCH MLS/HR: 10 INJECTION, SOLUTION INTRAVENOUS at 22:41

## 2018-04-10 NOTE — MEDICAL NUTRITION THERAPY
Nutrition Anthropometrics


Height (Inches):  64.00


Height (Calculated Centimeters:  162.112760


Weight (Pounds):  181


Weight (Calculated Kilograms):  82.270


BMI Calculated:  27.63


Gordon Nutrition Score:         Probably Inadequate 


Gordon Nutrition Risk Score:  13


Dietary Referral


Nutrition Risk Factors:      


Nutrition Risk Comment:





Nutritional Diagnosis


Nutritional Risk Acuity 2:  Pr Appetite > 3d


Nutritional Risk Acuity 3:  COPD Unstable


Past Medical History:  


HX of depression, anxiety, HTN, and GI bleed/ulcer, CKD- 3, COPD


Nutritional Acuity:  2-Moderate


Nutrition Diagnosis:  Increased Nutrient Needs


Nutrition Etiology:  Inadeq. Food/Harper Intake


Nutrition Problem/Etiology/Sym:  


NPO since admit on 4/5/18 only consuming clear liquids at this time. Pt


has increased nutrient needs related to COPD with exacerbation.


Energy Requirement:  1860 (kcal/day (23 kcal/kg)- [place] St Jeor RMR (1292) AF 

1.2, IF 1.2)


Protein Requirement:  82 (g protein/day (1.0 g/kg))


Fluid Requirement:  2470 (mL/day (30 mL/kg))


Diet Type:  Diet as Tolerated ANA/REG


Nutrition Intervention:  Cont diet as ordered, Encourage intake





Nutrition Monitoring & Eval


Nutrition Goals:  Eat % Meal


Nutrition Follow-Up:  Poor Intake


RD Patient Assessment Time:  30 minutes


RD Assessment Type:  RD Assessment


Patient Nutrition Acuity:  2-Moderate


Follow Up Date:  Apr 12, 2018


Nutritional Comment:  


Pt admitted for COPD exacerbation.   Pt on regualr diet but no intake  


reported at this time.  BNP elevated at 650.  BMI is in overwt range.   


Will cont to monitor and encourage intake.








4/8  Pt transferred to ICU after cardiac arrest episode.  , Alb  


2.9, High AST/ALT.  Pt NPO d/t intubation and sedation.  Plan to wean  


today.  Follow clinical progress, labs, etc.





4/10 Pt remains admitted in ICU on BiPAP. Pt diet advanced to ANA. Pt


currently tolerating clear liquids. BUN elevated at 42. Albumin was low


but is increasing. Pt intakes not currently meeting estimated need.


Continue to monitor and encourage increased intake.











PEDRO RODRIGUEZ Apr 10, 2018 13:48

## 2018-04-10 NOTE — HOSPITALIST PROGRESS NOTE
Subjective


Progress Notes


Subjective


She is awake and alert on BiPAP. She is able to speak a few words.





Physical Exam





Vital Signs








  Date Time  Temp Pulse Resp B/P (MAP) Pulse Ox O2 Delivery O2 Flow Rate FiO2


 


4/10/18 10:00  92      


 


4/10/18 10:00   17 173/87 (115) 99 High-Flow Nasal Cannula 6.0 


 


4/10/18 08:03        40.0


 


4/10/18 08:00 97.9       














Intake and Output 


 


 4/11/18





 07:00


 


Intake Total 600 ml


 


Balance 600 ml


 


 


 


Intake Oral 600 ml








General Appearance:  Alert, Awake


Cardiovascular:  Regular Rate and Rhythm


Respiratory:  Other (Diminished breath sounds with expiratory wheezes 

throughout with significantly prolonged expiratory phase)


Chest:  No Tenderness


GI:  Soft and Non-Tender


Extremities:  Warm, Perfused


Integumentary:  Generalized Fragile Skin


Result Diagram:  


4/10/18 0500                                                                   

             4/10/18 0500








Assessment and Plan


Problems:  


(1) COPD with exacerbation


Status:  Acute


Assessment & Plan:  She does have a history of severe COPD and presented with 

increased shortness of breath.  Her chest x-rays had been negative for an 

infiltrate, but are now showing a RLL infiltrate.  She is on treatment with 

nebulizers, IV corticosteroids, and ceftriaxone. She remains afebrile and her 

WBC had been normal (slightly elevated this AM). She did extubate herself 

yesterday and was initially doing fine on nasal cannula. She is now on BiPAP, 

but doing fairly well. Will see if can wean back to a nasal cannula at meal 

times. 





(2) Right lower lobe pneumonia


Status:  Acute


Assessment & Plan:  CXR is now showing a right lower lobe infiltrate. She is on 

Rocephin and remains afebrile. Watch closely.





(3) Acute respiratory failure


Assessment & Plan:  She was intubated on 4/7/18 during a code.  She received 

versed and propofol for sedation.  She extubated herself on 4/9. She is 

currently on nasal cannula with BiPAP as needed and is doing fairly well.





(4) Cardiorespiratory arrest


Status:  Acute


Assessment & Plan:  She did suffer a pulseless arrest on 4/7/18.  This was 

likely secondary to SVT with rates over 200 by telemetry report.





(5) Supraventricular tachycardia


Status:  Acute


Assessment & Plan:  She was noted to be in a narrow complex tachycardia during 

her code event.  She converted to sinus rhythm after receiving adenosine.  She 

has been in NSR with short runs of SVT since. She has received IV diltiazem 

intermittently for recurrent episodes. Will hold for now as she is in sinus 

rhythm. Watch closely.





(6) Hypertension


Status:  Chronic


Assessment & Plan:  She had been on diuretics, amlodipine and benazepril. These 

have all been on hold and her blood pressures are only mildly elevated. Will 

likely place her on Cardizem in place of amlodipine once she is ready to go 

back on orals to cover her for SVT as well.





(7) Anxiety


Status:  Chronic


Assessment & Plan:  She has been on chronic fluoxetine and alprazolam.





(8) Depression


Status:  Chronic


Assessment & Plan:  She has been on the fluoxetine.





(9) Hypothyroidism


Status:  Chronic


Assessment & Plan:  She is on chronic treatment with Synthroid. Will continue.





(10) CKD (chronic kidney disease), stage III


Status:  Chronic


(11) Elevated troponin


Assessment & Plan:  She has had an elevated troponin, but these have not 

trended in a pattern consistent with infarction.  It is likely secondary to her 

chronic kidney disease.





(12) Acute systolic right heart failure


Assessment & Plan:  She did have an elevated BNP.  Her echocardiogram is 

reported to show a preserved ejection fraction, but pulmonary hypertension.   

She received Lasix 4/8, but her creatinine increased. Watch labs.








Exam


Sepsis Risk:  No Definite Risk





Problem Qualifiers





(1) Hypertension:  


Hypertension type:  essential hypertension  Qualified Codes:  I10 - Essential (

primary) hypertension








ANDRE RODRIGUEZ MD Apr 10, 2018 10:48

## 2018-04-11 VITALS — SYSTOLIC BLOOD PRESSURE: 168 MMHG | DIASTOLIC BLOOD PRESSURE: 87 MMHG

## 2018-04-11 VITALS — SYSTOLIC BLOOD PRESSURE: 160 MMHG | DIASTOLIC BLOOD PRESSURE: 77 MMHG

## 2018-04-11 VITALS — SYSTOLIC BLOOD PRESSURE: 172 MMHG | DIASTOLIC BLOOD PRESSURE: 90 MMHG

## 2018-04-11 VITALS — DIASTOLIC BLOOD PRESSURE: 90 MMHG | SYSTOLIC BLOOD PRESSURE: 165 MMHG

## 2018-04-11 VITALS — DIASTOLIC BLOOD PRESSURE: 97 MMHG | SYSTOLIC BLOOD PRESSURE: 188 MMHG

## 2018-04-11 VITALS — SYSTOLIC BLOOD PRESSURE: 162 MMHG | DIASTOLIC BLOOD PRESSURE: 91 MMHG

## 2018-04-11 VITALS — SYSTOLIC BLOOD PRESSURE: 182 MMHG | DIASTOLIC BLOOD PRESSURE: 90 MMHG

## 2018-04-11 VITALS — SYSTOLIC BLOOD PRESSURE: 171 MMHG | DIASTOLIC BLOOD PRESSURE: 85 MMHG

## 2018-04-11 VITALS — DIASTOLIC BLOOD PRESSURE: 102 MMHG | SYSTOLIC BLOOD PRESSURE: 166 MMHG

## 2018-04-11 VITALS — SYSTOLIC BLOOD PRESSURE: 168 MMHG | DIASTOLIC BLOOD PRESSURE: 96 MMHG

## 2018-04-11 VITALS — SYSTOLIC BLOOD PRESSURE: 164 MMHG | DIASTOLIC BLOOD PRESSURE: 92 MMHG

## 2018-04-11 VITALS — SYSTOLIC BLOOD PRESSURE: 177 MMHG | DIASTOLIC BLOOD PRESSURE: 113 MMHG

## 2018-04-11 VITALS — SYSTOLIC BLOOD PRESSURE: 155 MMHG | DIASTOLIC BLOOD PRESSURE: 78 MMHG

## 2018-04-11 VITALS — DIASTOLIC BLOOD PRESSURE: 87 MMHG | SYSTOLIC BLOOD PRESSURE: 154 MMHG

## 2018-04-11 VITALS — SYSTOLIC BLOOD PRESSURE: 158 MMHG | DIASTOLIC BLOOD PRESSURE: 90 MMHG

## 2018-04-11 VITALS — SYSTOLIC BLOOD PRESSURE: 161 MMHG | DIASTOLIC BLOOD PRESSURE: 74 MMHG

## 2018-04-11 VITALS — SYSTOLIC BLOOD PRESSURE: 162 MMHG | DIASTOLIC BLOOD PRESSURE: 75 MMHG

## 2018-04-11 VITALS — DIASTOLIC BLOOD PRESSURE: 91 MMHG | SYSTOLIC BLOOD PRESSURE: 172 MMHG

## 2018-04-11 VITALS — DIASTOLIC BLOOD PRESSURE: 96 MMHG | SYSTOLIC BLOOD PRESSURE: 159 MMHG

## 2018-04-11 VITALS — DIASTOLIC BLOOD PRESSURE: 83 MMHG | SYSTOLIC BLOOD PRESSURE: 167 MMHG

## 2018-04-11 VITALS — SYSTOLIC BLOOD PRESSURE: 161 MMHG | DIASTOLIC BLOOD PRESSURE: 92 MMHG

## 2018-04-11 VITALS — SYSTOLIC BLOOD PRESSURE: 158 MMHG | DIASTOLIC BLOOD PRESSURE: 80 MMHG

## 2018-04-11 VITALS — DIASTOLIC BLOOD PRESSURE: 87 MMHG | SYSTOLIC BLOOD PRESSURE: 156 MMHG

## 2018-04-11 VITALS — SYSTOLIC BLOOD PRESSURE: 161 MMHG | DIASTOLIC BLOOD PRESSURE: 85 MMHG

## 2018-04-11 VITALS — DIASTOLIC BLOOD PRESSURE: 96 MMHG | SYSTOLIC BLOOD PRESSURE: 167 MMHG

## 2018-04-11 VITALS — SYSTOLIC BLOOD PRESSURE: 165 MMHG | DIASTOLIC BLOOD PRESSURE: 90 MMHG

## 2018-04-11 VITALS — DIASTOLIC BLOOD PRESSURE: 90 MMHG | SYSTOLIC BLOOD PRESSURE: 172 MMHG

## 2018-04-11 VITALS — SYSTOLIC BLOOD PRESSURE: 169 MMHG | DIASTOLIC BLOOD PRESSURE: 83 MMHG

## 2018-04-11 VITALS — SYSTOLIC BLOOD PRESSURE: 160 MMHG | DIASTOLIC BLOOD PRESSURE: 114 MMHG

## 2018-04-11 VITALS — SYSTOLIC BLOOD PRESSURE: 176 MMHG | DIASTOLIC BLOOD PRESSURE: 96 MMHG

## 2018-04-11 VITALS — DIASTOLIC BLOOD PRESSURE: 87 MMHG | SYSTOLIC BLOOD PRESSURE: 166 MMHG

## 2018-04-11 VITALS — DIASTOLIC BLOOD PRESSURE: 103 MMHG | SYSTOLIC BLOOD PRESSURE: 162 MMHG

## 2018-04-11 VITALS — SYSTOLIC BLOOD PRESSURE: 159 MMHG | DIASTOLIC BLOOD PRESSURE: 73 MMHG

## 2018-04-11 VITALS — SYSTOLIC BLOOD PRESSURE: 158 MMHG | DIASTOLIC BLOOD PRESSURE: 91 MMHG

## 2018-04-11 VITALS — SYSTOLIC BLOOD PRESSURE: 162 MMHG | DIASTOLIC BLOOD PRESSURE: 101 MMHG

## 2018-04-11 VITALS — DIASTOLIC BLOOD PRESSURE: 76 MMHG | SYSTOLIC BLOOD PRESSURE: 157 MMHG

## 2018-04-11 VITALS — DIASTOLIC BLOOD PRESSURE: 121 MMHG | SYSTOLIC BLOOD PRESSURE: 176 MMHG

## 2018-04-11 VITALS — DIASTOLIC BLOOD PRESSURE: 78 MMHG | SYSTOLIC BLOOD PRESSURE: 165 MMHG

## 2018-04-11 VITALS — DIASTOLIC BLOOD PRESSURE: 75 MMHG | SYSTOLIC BLOOD PRESSURE: 162 MMHG

## 2018-04-11 VITALS — SYSTOLIC BLOOD PRESSURE: 167 MMHG | DIASTOLIC BLOOD PRESSURE: 100 MMHG

## 2018-04-11 VITALS — SYSTOLIC BLOOD PRESSURE: 169 MMHG | DIASTOLIC BLOOD PRESSURE: 95 MMHG

## 2018-04-11 VITALS — SYSTOLIC BLOOD PRESSURE: 160 MMHG | DIASTOLIC BLOOD PRESSURE: 88 MMHG

## 2018-04-11 VITALS — SYSTOLIC BLOOD PRESSURE: 166 MMHG | DIASTOLIC BLOOD PRESSURE: 108 MMHG

## 2018-04-11 VITALS — DIASTOLIC BLOOD PRESSURE: 91 MMHG | SYSTOLIC BLOOD PRESSURE: 166 MMHG

## 2018-04-11 VITALS — DIASTOLIC BLOOD PRESSURE: 90 MMHG | SYSTOLIC BLOOD PRESSURE: 164 MMHG

## 2018-04-11 LAB — PLATELET COUNT, AUTOMATED: 184 K/UL (ref 150–450)

## 2018-04-11 RX ADMIN — SUCRALFATE SCH GM: 1 TABLET ORAL at 12:23

## 2018-04-11 RX ADMIN — SUCRALFATE SCH GM: 1 TABLET ORAL at 17:14

## 2018-04-11 RX ADMIN — SUCRALFATE SCH GM: 1 TABLET ORAL at 20:29

## 2018-04-11 RX ADMIN — LEVALBUTEROL HYDROCHLORIDE SCH MG: 1.25 SOLUTION RESPIRATORY (INHALATION) at 13:18

## 2018-04-11 RX ADMIN — SUCRALFATE SCH GM: 1 TABLET ORAL at 05:15

## 2018-04-11 RX ADMIN — PANTOPRAZOLE SODIUM SCH MG: 40 INJECTION, POWDER, FOR SOLUTION INTRAVENOUS at 08:52

## 2018-04-11 RX ADMIN — LEVOTHYROXINE SODIUM ANHYDROUS SCH MCG: 100 INJECTION, POWDER, LYOPHILIZED, FOR SOLUTION INTRAVENOUS at 08:52

## 2018-04-11 RX ADMIN — LEVALBUTEROL HYDROCHLORIDE SCH MG: 1.25 SOLUTION RESPIRATORY (INHALATION) at 05:40

## 2018-04-11 RX ADMIN — AZITHROMYCIN MONOHYDRATE SCH MLS/HR: 500 INJECTION, POWDER, LYOPHILIZED, FOR SOLUTION INTRAVENOUS at 08:53

## 2018-04-11 RX ADMIN — PANTOPRAZOLE SODIUM SCH MG: 40 INJECTION, POWDER, FOR SOLUTION INTRAVENOUS at 20:29

## 2018-04-11 RX ADMIN — ACETAMINOPHEN PRN MG: 325 TABLET ORAL at 18:15

## 2018-04-11 RX ADMIN — LEVALBUTEROL HYDROCHLORIDE SCH MG: 1.25 SOLUTION RESPIRATORY (INHALATION) at 09:10

## 2018-04-11 RX ADMIN — LIDOCAINE HYDROCHLORIDE SCH MLS/HR: 10 INJECTION, SOLUTION INFILTRATION; PERINEURAL at 05:09

## 2018-04-11 RX ADMIN — SODIUM CHLORIDE SCH MG: 900 INJECTION, SOLUTION INTRAVENOUS at 05:08

## 2018-04-11 RX ADMIN — LEVALBUTEROL HYDROCHLORIDE SCH MG: 1.25 SOLUTION RESPIRATORY (INHALATION) at 18:36

## 2018-04-11 NOTE — HOSPITALIST PROGRESS NOTE
Subjective


Progress Notes


Subjective


This patient was admitted for COPD.  She had no acute events overnight.





Patient Complains of:


Cardiovascular:  No: Chest Pain


Respiratory:  Cough, Shortness of Breath





Physical Exam





Vital Signs








  Date Time  Temp Pulse Resp B/P (MAP) Pulse Ox O2 Delivery O2 Flow Rate FiO2


 


4/11/18 10:42     96 Bi-PAP  40.0


 


4/11/18 10:31  93      


 


4/11/18 10:30 97.7  22 162/101 (121)    


 


4/11/18 08:30       6.0 














Intake and Output 


 


 4/12/18





 07:00


 


Intake Total 264 ml


 


Output Total 65 ml


 


Balance 199 ml


 


 


 


IV Total 264 ml


 


Output Urine Total 65 ml








Neuro:  No Gross deficits


Eyes:  PERRLA


Cardiovascular:  Regular Rate and Rhythm


Respiratory:  Other (Bilateral breath sounds present.  Rhonchi bilateral.)


Extremities:  No Edema


Integumentary:  No Cyanosis


Result Diagram:  


4/11/18 0558                                                                   

             4/11/18 0558





Imaging


Chest x-ray reviewed.





Assessment and Plan


Problems:  


(1) COPD with exacerbation


Status:  Acute


Assessment & Plan:  She does have a history of severe COPD and presented with 

increased shortness of breath. She is on treatment with nebulizers and IV Solu 

Medrol.   We will convert her to oral prednisone today.





(2) Right lower lobe pneumonia


Status:  Acute


Assessment & Plan:  Her chest x-rays have shown a developing infiltrate on the 

right.  She had been on empiric treatment with ceftriaxone, but we also added 

azithromycin today.  Cultures are not available.





(3) Acute respiratory failure


Assessment & Plan:  She was intubated on 4/7/18 during a code.  She extubated 

herself on 4/9.  She currently is wearing BiPAP intermittently.





(4) Cardiorespiratory arrest


Status:  Acute


Assessment & Plan:  She did suffer a pulseless arrest on 4/7/18.  This was 

likely secondary to SVT with rates over 200 by telemetry report.





(5) Supraventricular tachycardia


Status:  Acute


Assessment & Plan:  She was noted to be in a narrow complex tachycardia during 

her code event.  She converted to sinus rhythm after receiving adenosine.  She 

has continued to have short runs of SVT in the ICU.  She was placed on a 

diltiazem drip over the last few days, but we will be converting her to oral 

treatment today.





(6) Hypertension


Status:  Chronic


Assessment & Plan:  She had been on diuretics, amlodipine and benazepril, which 

have been on hold.  We will follow her blood pressure after starting the 

diltiazem.





(7) Anxiety


Status:  Chronic


Assessment & Plan:  She has been on chronic fluoxetine and alprazolam.





(8) Depression


Status:  Chronic


Assessment & Plan:  She has been on the fluoxetine.





(9) Hypothyroidism


Status:  Chronic


Assessment & Plan:  She is on chronic treatment with Synthroid. 





(10) CKD (chronic kidney disease), stage III


Status:  Chronic


(11) Elevated troponin


Assessment & Plan:  She has had an elevated troponin, but these have not 

trended in a pattern consistent with infarction.  It is likely secondary to her 

chronic kidney disease.





(12) Acute systolic right heart failure


Assessment & Plan:  She did have an elevated BNP.  Her echocardiogram is 

reported to show a preserved ejection fraction, but pulmonary hypertension.   

Her weights are increased approximately 13kg since admission.  We will 

administer Lasix today.








Exam


Sepsis Risk:  No Definite Risk





Problem Qualifiers





(1) Hypertension:  


Hypertension type:  essential hypertension  Qualified Codes:  I10 - Essential (

primary) hypertension








RUPESH MAYEN DO Apr 11, 2018 11:05

## 2018-04-11 NOTE — RADIOLOGY IMAGING REPORT
[place] [facility] [place] 

 

PATIENT NAME: Danica Zapata

: 1940

MR: [id number]

V: [id number]

EXAM DATE: 

ORDERING PHYSICIAN: ANDRE RODRIGUEZ

TECHNOLOGIST: 

 

Location: [facility]

Patient: Danica Zapata

: 1940

MRN: [id number]

Visit/Account:[id number]

Date of Sevice:  2018

 

ACCESSION #: [id number]

 

PORTABLE CHEST:

 

Indication: Pneumonia.

Technique: A single frontal film was obtained.

Comparison: 2018

 

Lines and tubes: The ET tube and NG tube were removed. No new lines or tubes are present.

Skeletal and soft tissue structures: Intact and unchanged.

Heart and mediastinum: Stable.

Lung fields: There is worsening consolidation and volume loss at the right base. Otherwise unchanged,
 allowing for technical differences.

Pleural spaces: A small right effusion is not excluded. There is no evidence of pneumothorax.

 

Impression: Worsening consolidation and volume loss at the right lung base.

 

Report Dictated By: Art Reyes MD at 2018 6:39 AM

 

Report E-Signed By: Art Reyes MD  at 2018 6:41 AM

 

WSN:[id number]

## 2018-04-12 VITALS — DIASTOLIC BLOOD PRESSURE: 81 MMHG | SYSTOLIC BLOOD PRESSURE: 160 MMHG

## 2018-04-12 VITALS — SYSTOLIC BLOOD PRESSURE: 184 MMHG | DIASTOLIC BLOOD PRESSURE: 109 MMHG

## 2018-04-12 VITALS — SYSTOLIC BLOOD PRESSURE: 186 MMHG | DIASTOLIC BLOOD PRESSURE: 87 MMHG

## 2018-04-12 VITALS — SYSTOLIC BLOOD PRESSURE: 177 MMHG | DIASTOLIC BLOOD PRESSURE: 95 MMHG

## 2018-04-12 VITALS — DIASTOLIC BLOOD PRESSURE: 101 MMHG | SYSTOLIC BLOOD PRESSURE: 139 MMHG

## 2018-04-12 VITALS — DIASTOLIC BLOOD PRESSURE: 98 MMHG | SYSTOLIC BLOOD PRESSURE: 192 MMHG

## 2018-04-12 VITALS — SYSTOLIC BLOOD PRESSURE: 175 MMHG | DIASTOLIC BLOOD PRESSURE: 101 MMHG

## 2018-04-12 VITALS — SYSTOLIC BLOOD PRESSURE: 150 MMHG | DIASTOLIC BLOOD PRESSURE: 70 MMHG

## 2018-04-12 VITALS — DIASTOLIC BLOOD PRESSURE: 100 MMHG | SYSTOLIC BLOOD PRESSURE: 174 MMHG

## 2018-04-12 VITALS — DIASTOLIC BLOOD PRESSURE: 113 MMHG | SYSTOLIC BLOOD PRESSURE: 193 MMHG

## 2018-04-12 VITALS — DIASTOLIC BLOOD PRESSURE: 83 MMHG | SYSTOLIC BLOOD PRESSURE: 161 MMHG

## 2018-04-12 VITALS — SYSTOLIC BLOOD PRESSURE: 182 MMHG | DIASTOLIC BLOOD PRESSURE: 100 MMHG

## 2018-04-12 VITALS — SYSTOLIC BLOOD PRESSURE: 175 MMHG | DIASTOLIC BLOOD PRESSURE: 84 MMHG

## 2018-04-12 VITALS — SYSTOLIC BLOOD PRESSURE: 151 MMHG | DIASTOLIC BLOOD PRESSURE: 78 MMHG

## 2018-04-12 VITALS — SYSTOLIC BLOOD PRESSURE: 179 MMHG | DIASTOLIC BLOOD PRESSURE: 90 MMHG

## 2018-04-12 VITALS — DIASTOLIC BLOOD PRESSURE: 137 MMHG | SYSTOLIC BLOOD PRESSURE: 205 MMHG

## 2018-04-12 VITALS — DIASTOLIC BLOOD PRESSURE: 98 MMHG | SYSTOLIC BLOOD PRESSURE: 182 MMHG

## 2018-04-12 VITALS — SYSTOLIC BLOOD PRESSURE: 182 MMHG | DIASTOLIC BLOOD PRESSURE: 102 MMHG

## 2018-04-12 VITALS — DIASTOLIC BLOOD PRESSURE: 99 MMHG | SYSTOLIC BLOOD PRESSURE: 184 MMHG

## 2018-04-12 VITALS — DIASTOLIC BLOOD PRESSURE: 98 MMHG | SYSTOLIC BLOOD PRESSURE: 177 MMHG

## 2018-04-12 VITALS — DIASTOLIC BLOOD PRESSURE: 94 MMHG | SYSTOLIC BLOOD PRESSURE: 176 MMHG

## 2018-04-12 VITALS — DIASTOLIC BLOOD PRESSURE: 98 MMHG | SYSTOLIC BLOOD PRESSURE: 188 MMHG

## 2018-04-12 VITALS — SYSTOLIC BLOOD PRESSURE: 185 MMHG | DIASTOLIC BLOOD PRESSURE: 100 MMHG

## 2018-04-12 VITALS — SYSTOLIC BLOOD PRESSURE: 182 MMHG | DIASTOLIC BLOOD PRESSURE: 97 MMHG

## 2018-04-12 VITALS — SYSTOLIC BLOOD PRESSURE: 174 MMHG | DIASTOLIC BLOOD PRESSURE: 97 MMHG

## 2018-04-12 VITALS — DIASTOLIC BLOOD PRESSURE: 98 MMHG | SYSTOLIC BLOOD PRESSURE: 189 MMHG

## 2018-04-12 VITALS — SYSTOLIC BLOOD PRESSURE: 178 MMHG | DIASTOLIC BLOOD PRESSURE: 92 MMHG

## 2018-04-12 VITALS — DIASTOLIC BLOOD PRESSURE: 108 MMHG | SYSTOLIC BLOOD PRESSURE: 181 MMHG

## 2018-04-12 VITALS — DIASTOLIC BLOOD PRESSURE: 87 MMHG | SYSTOLIC BLOOD PRESSURE: 168 MMHG

## 2018-04-12 VITALS — SYSTOLIC BLOOD PRESSURE: 167 MMHG | DIASTOLIC BLOOD PRESSURE: 95 MMHG

## 2018-04-12 VITALS — SYSTOLIC BLOOD PRESSURE: 171 MMHG | DIASTOLIC BLOOD PRESSURE: 88 MMHG

## 2018-04-12 VITALS — SYSTOLIC BLOOD PRESSURE: 162 MMHG | DIASTOLIC BLOOD PRESSURE: 99 MMHG

## 2018-04-12 VITALS — DIASTOLIC BLOOD PRESSURE: 114 MMHG | SYSTOLIC BLOOD PRESSURE: 169 MMHG

## 2018-04-12 VITALS — DIASTOLIC BLOOD PRESSURE: 73 MMHG | SYSTOLIC BLOOD PRESSURE: 158 MMHG

## 2018-04-12 VITALS — SYSTOLIC BLOOD PRESSURE: 175 MMHG | DIASTOLIC BLOOD PRESSURE: 85 MMHG

## 2018-04-12 VITALS — SYSTOLIC BLOOD PRESSURE: 152 MMHG | DIASTOLIC BLOOD PRESSURE: 72 MMHG

## 2018-04-12 LAB — PLATELET COUNT, AUTOMATED: 168 K/UL (ref 150–450)

## 2018-04-12 RX ADMIN — PANTOPRAZOLE SODIUM SCH MG: 40 INJECTION, POWDER, FOR SOLUTION INTRAVENOUS at 09:29

## 2018-04-12 RX ADMIN — SODIUM CHLORIDE SCH MG: 900 INJECTION, SOLUTION INTRAVENOUS at 09:28

## 2018-04-12 RX ADMIN — IMIPENEM AND CILASTATIN SODIUM SCH MLS/HR: 500; 500 INJECTION, POWDER, FOR SOLUTION INTRAVENOUS at 10:55

## 2018-04-12 RX ADMIN — ACETAMINOPHEN PRN MG: 325 TABLET ORAL at 01:01

## 2018-04-12 RX ADMIN — POTASSIUM CHLORIDE SCH MEQ: 750 TABLET, FILM COATED, EXTENDED RELEASE ORAL at 17:40

## 2018-04-12 RX ADMIN — SODIUM CHLORIDE SCH MG: 900 INJECTION, SOLUTION INTRAVENOUS at 16:30

## 2018-04-12 RX ADMIN — LIDOCAINE HYDROCHLORIDE SCH MLS/HR: 10 INJECTION, SOLUTION INFILTRATION; PERINEURAL at 06:14

## 2018-04-12 RX ADMIN — IMIPENEM AND CILASTATIN SODIUM SCH MLS/HR: 500; 500 INJECTION, POWDER, FOR SOLUTION INTRAVENOUS at 21:26

## 2018-04-12 RX ADMIN — SUCRALFATE SCH GM: 1 TABLET ORAL at 16:30

## 2018-04-12 RX ADMIN — AZITHROMYCIN MONOHYDRATE SCH MLS/HR: 500 INJECTION, POWDER, LYOPHILIZED, FOR SOLUTION INTRAVENOUS at 09:30

## 2018-04-12 RX ADMIN — IMIPENEM AND CILASTATIN SODIUM SCH MLS/HR: 500; 500 INJECTION, POWDER, FOR SOLUTION INTRAVENOUS at 16:31

## 2018-04-12 RX ADMIN — LEVALBUTEROL HYDROCHLORIDE SCH MG: 1.25 SOLUTION RESPIRATORY (INHALATION) at 13:26

## 2018-04-12 RX ADMIN — SUCRALFATE SCH GM: 1 TABLET ORAL at 06:14

## 2018-04-12 RX ADMIN — LEVALBUTEROL HYDROCHLORIDE SCH MG: 1.25 SOLUTION RESPIRATORY (INHALATION) at 05:58

## 2018-04-12 RX ADMIN — PANTOPRAZOLE SODIUM SCH MG: 40 INJECTION, POWDER, FOR SOLUTION INTRAVENOUS at 21:26

## 2018-04-12 RX ADMIN — SUCRALFATE SCH GM: 1 TABLET ORAL at 21:27

## 2018-04-12 RX ADMIN — LEVALBUTEROL HYDROCHLORIDE SCH MG: 1.25 SOLUTION RESPIRATORY (INHALATION) at 16:58

## 2018-04-12 RX ADMIN — LEVOTHYROXINE SODIUM SCH MG: 100 TABLET ORAL at 09:30

## 2018-04-12 RX ADMIN — LEVALBUTEROL HYDROCHLORIDE SCH MG: 1.25 SOLUTION RESPIRATORY (INHALATION) at 09:23

## 2018-04-12 RX ADMIN — SUCRALFATE SCH GM: 1 TABLET ORAL at 13:01

## 2018-04-12 NOTE — RADIOLOGY IMAGING REPORT
FACILITY: [facility] - [place] 

 

PATIENT NAME: Danica Zapata

: 1940

MR: [id number]

V: [id number]

EXAM DATE: 

ORDERING PHYSICIAN: RUPESH MAYEN

TECHNOLOGIST: 

 

Location: [facility]

Patient: Danica Zapata

: 1940

MRN: [id number]

Visit/Account:[id number]

Date of Sevice:  2018

 

ACCESSION #: [id number]

 

PORTABLE CHEST:

 

Indication: Pneumonia.

Technique: A single frontal film was obtained.

Comparison: 2018

 

Skeletal and soft tissue structures: Intact and unchanged.

Heart and mediastinum: Stable.

Lung fields: The basilar parenchymal opacities are not significantly changed, allowing for technical 
differences. No new focal opacities are identified.

Pleural spaces: Small effusions appear unchanged. There is no evidence of pneumothorax.

 

Impression: No significant change.

 

Report Dictated By: Art Reyes MD at 2018 6:21 AM

 

Report E-Signed By: Art Reyes MD  at 2018 6:22 AM

 

WSN:[id number]

## 2018-04-12 NOTE — MEDICAL NUTRITION THERAPY
Nutrition Anthropometrics


Height (Inches):  64.00


Height (Calculated Centimeters:  162.454865


Weight (Pounds):  190


Weight (Calculated Kilograms):  86.381


BMI Calculated:  27.63


Gordon Nutrition Score:         Probably Inadequate 


Gordon Nutrition Risk Score:  14


Dietary Referral


Nutrition Risk Factors:      


Nutrition Risk Comment:





Nutritional Diagnosis


Nutritional Risk Acuity 2:  Pr Appetite > 3d


Nutritional Risk Acuity 3:  COPD Unstable


Past Medical History:  


HX of depression, anxiety, HTN, and GI bleed/ulcer, CKD- 3, COPD


Nutritional Acuity:  2-Moderate


Nutrition Diagnosis:  Increased Nutrient Needs


Nutrition Etiology:  Inadeq. Food/Harper Intake


Nutrition Problem/Etiology/Sym:  


NPO since admit on 4/5/18 only consuming clear liquids at this time. Pt


has increased nutrient needs related to COPD with exacerbation.


Energy Requirement:  1860 (kcal/day (23 kcal/kg)- [place] St Jeor RMR (1292) AF 

1.2, IF 1.2)


Protein Requirement:  82 (g protein/day (1.0 g/kg))


Fluid Requirement:  2470 (mL/day (30 mL/kg))


Diet Type:  Diet as Tolerated ANA/REG


Nutrition Intervention:  Cont diet as ordered, Encourage intake


Food Dislikes:  Dislikes chocolate ensure


Diet Comment To [facility]:  


OFFER VANILLA NUTRITION SUPPLEMENT





Nutrition Monitoring & Eval


Nutrition Goals:  Eat % Meal


Nutrition Follow-Up:  Poor Intake


RD Patient Assessment Time:  30 minutes


RD Assessment Type:  RD Re-Assessment


Patient Nutrition Acuity:  2-Moderate


Follow Up Date:  Apr 16, 2018


Nutritional Comment:  


Pt admitted for COPD exacerbation.   Pt on regualr diet but no intake  


reported at this time.  BNP elevated at 650.  BMI is in overwt range.   


Will cont to monitor and encourage intake.





4/8  Pt transferred to ICU after cardiac arrest episode.  , Alb  


2.9, High AST/ALT.  Pt NPO d/t intubation and sedation.  Plan to wean  


today.  Follow clinical progress, labs, etc.





4/10 Pt remains admitted in ICU on BiPAP. Pt diet advanced to ANA. Pt


currently tolerating clear liquids. BUN elevated at 42. Albumin was low


but is increasing. Pt intakes not currently meeting estimated need.


Continue to monitor and encourage increased intake.





4/12 Pt diet as tolerated. Recorded intakes report pt continues to only


consume liquids at this time. Writer visited pt room. Pt consuming eggs


with toast. Writer encouraged pt to consume protein on each meal tray to


encourage strength and healing. Pt reports that she has tried chocolate


ensure and dislikes the flavor but is willing to try vanilla ensure.


Writer will continue to monitor pt intake and progress.











PEDRO RODRIGUEZ Apr 12, 2018 08:41

## 2018-04-12 NOTE — HOSPITALIST PROGRESS NOTE
Subjective


Progress Notes


Subjective


The patient is much more alert and interactive today. She denies new complaints.





Physical Exam





Vital Signs








  Date Time  Temp Pulse Resp B/P (MAP) Pulse Ox O2 Delivery O2 Flow Rate FiO2


 


4/12/18 11:59  83      


 


4/12/18 11:00 98.6  18 175/101 (125) 97 Bi-PAP  60.0


 


4/12/18 08:00       8.0 














Intake and Output 


 


 4/13/18





 06:59


 


Intake Total 229 ml


 


Output Total 2125 ml


 


Balance -1896 ml


 


 


 


Intake Oral 120 ml


 


IV Total 109 ml


 


Output Urine Total 2125 ml








General Appearance:  Alert, Awake, Other (Mild to moderate increased work of 

breathing.)


Neuro:  No Gross deficits


Eyes:  PERRLA


Cardiovascular:  Regular Rate and Rhythm


Respiratory:  Other (Crackles, both bases.)


GI:  Soft and Non-Tender


Extremities:  Warm, Perfused


Integumentary:  Skin Intact without Lesion / Mass


Psych:  Appropriate Mood & Affect


Result Diagram:  


4/12/18 0504                                                                   

             4/12/18 0504








Assessment and Plan


Problems:  


(1) COPD with exacerbation


Status:  Acute


Assessment & Plan:  She does have a history of severe COPD and presented with 

increased shortness of breath. She is on treatment with nebulizers and IV Solu 

Medrol.   She was converted to oral prednisone on 04/11 but has had increased 

wheezing today. Will place back on IV steroids. She is getting nebulizer 

treatments every 4 hours. CXR today shows bibasilar infiltrates.





(2) Right lower lobe pneumonia


Status:  Acute


Assessment & Plan:  Her chest x-rays have shown a developing infiltrate on the 

right.  On CXR from 04/12 she is noted to have bibasilar infiltrates. She had 

been on empiric treatment with ceftriaxone, but we also added azithromycin on 04 /11.  Cultures are not available.





(3) Acute respiratory failure


Assessment & Plan:  She was intubated on 4/7/18 during a code.  She extubated 

herself on 4/9.  She currently is wearing BiPAP intermittently.





(4) Cardiorespiratory arrest


Status:  Acute


Assessment & Plan:  She did suffer a pulseless arrest on 4/7/18.  This was 

likely secondary to SVT with rates over 200 by telemetry report.





(5) Supraventricular tachycardia


Status:  Acute


Assessment & Plan:  She was noted to be in a narrow complex tachycardia during 

her code event.  She converted to sinus rhythm after receiving adenosine.  She 

has continued to have short runs of SVT in the ICU.  She was placed on a 

diltiazem drip initially, but was converted to oral therapy on 04/11.





(6) Hypertension


Status:  Chronic


Assessment & Plan:  She had been on diuretics, amlodipine and benazepril, which 

have been on hold.  We will follow her blood pressure after starting the 

diltiazem.





(7) Anxiety


Status:  Chronic


Assessment & Plan:  She has been on chronic fluoxetine and alprazolam.





(8) Depression


Status:  Chronic


Assessment & Plan:  She has been on the fluoxetine.





(9) Hypothyroidism


Status:  Chronic


Assessment & Plan:  She is on chronic treatment with Synthroid. 





(10) CKD (chronic kidney disease), stage III


Status:  Chronic


(11) Elevated troponin


Assessment & Plan:  She has had an elevated troponin, but these have not 

trended in a pattern consistent with infarction.  It is likely secondary to her 

chronic kidney disease.





(12) Acute systolic right heart failure


Assessment & Plan:  She did have an elevated BNP.  Her echocardiogram is 

reported to show a preserved ejection fraction, but pulmonary hypertension.   

Her weights are increased approximately 13kg since admission.  We will 

administer Lasix as needed.





Time Spent on Plan of Care:  < 30 min





Exam


Sepsis Risk:  No Definite Risk





Problem Qualifiers





(1) Hypertension:  


Hypertension type:  essential hypertension  Qualified Codes:  I10 - Essential (

primary) hypertension








GABBY RODRIGUEZ MD Apr 12, 2018 13:18

## 2018-04-13 VITALS — DIASTOLIC BLOOD PRESSURE: 70 MMHG | SYSTOLIC BLOOD PRESSURE: 150 MMHG

## 2018-04-13 VITALS — SYSTOLIC BLOOD PRESSURE: 159 MMHG | DIASTOLIC BLOOD PRESSURE: 89 MMHG

## 2018-04-13 VITALS — DIASTOLIC BLOOD PRESSURE: 88 MMHG | SYSTOLIC BLOOD PRESSURE: 172 MMHG

## 2018-04-13 VITALS — DIASTOLIC BLOOD PRESSURE: 77 MMHG | SYSTOLIC BLOOD PRESSURE: 161 MMHG

## 2018-04-13 VITALS — SYSTOLIC BLOOD PRESSURE: 171 MMHG | DIASTOLIC BLOOD PRESSURE: 78 MMHG

## 2018-04-13 VITALS — SYSTOLIC BLOOD PRESSURE: 161 MMHG | DIASTOLIC BLOOD PRESSURE: 127 MMHG

## 2018-04-13 VITALS — SYSTOLIC BLOOD PRESSURE: 162 MMHG | DIASTOLIC BLOOD PRESSURE: 94 MMHG

## 2018-04-13 VITALS — SYSTOLIC BLOOD PRESSURE: 147 MMHG | DIASTOLIC BLOOD PRESSURE: 71 MMHG

## 2018-04-13 VITALS — SYSTOLIC BLOOD PRESSURE: 155 MMHG | DIASTOLIC BLOOD PRESSURE: 76 MMHG

## 2018-04-13 VITALS — SYSTOLIC BLOOD PRESSURE: 172 MMHG | DIASTOLIC BLOOD PRESSURE: 92 MMHG

## 2018-04-13 VITALS — DIASTOLIC BLOOD PRESSURE: 76 MMHG | SYSTOLIC BLOOD PRESSURE: 150 MMHG

## 2018-04-13 VITALS — SYSTOLIC BLOOD PRESSURE: 169 MMHG | DIASTOLIC BLOOD PRESSURE: 89 MMHG

## 2018-04-13 VITALS — SYSTOLIC BLOOD PRESSURE: 170 MMHG | DIASTOLIC BLOOD PRESSURE: 116 MMHG

## 2018-04-13 VITALS — SYSTOLIC BLOOD PRESSURE: 169 MMHG | DIASTOLIC BLOOD PRESSURE: 74 MMHG

## 2018-04-13 VITALS — SYSTOLIC BLOOD PRESSURE: 182 MMHG | DIASTOLIC BLOOD PRESSURE: 136 MMHG

## 2018-04-13 VITALS — DIASTOLIC BLOOD PRESSURE: 81 MMHG | SYSTOLIC BLOOD PRESSURE: 140 MMHG

## 2018-04-13 LAB — PLATELET COUNT, AUTOMATED: 153 K/UL (ref 150–450)

## 2018-04-13 RX ADMIN — IMIPENEM AND CILASTATIN SODIUM SCH MLS/HR: 500; 500 INJECTION, POWDER, FOR SOLUTION INTRAVENOUS at 03:36

## 2018-04-13 RX ADMIN — POTASSIUM CHLORIDE SCH MEQ: 750 TABLET, FILM COATED, EXTENDED RELEASE ORAL at 08:28

## 2018-04-13 RX ADMIN — AZITHROMYCIN MONOHYDRATE SCH MLS/HR: 500 INJECTION, POWDER, LYOPHILIZED, FOR SOLUTION INTRAVENOUS at 09:15

## 2018-04-13 RX ADMIN — SUCRALFATE SCH GM: 1 TABLET ORAL at 17:20

## 2018-04-13 RX ADMIN — IMIPENEM AND CILASTATIN SODIUM SCH MLS/HR: 500; 500 INJECTION, POWDER, FOR SOLUTION INTRAVENOUS at 11:23

## 2018-04-13 RX ADMIN — IMIPENEM AND CILASTATIN SODIUM SCH MLS/HR: 500; 500 INJECTION, POWDER, FOR SOLUTION INTRAVENOUS at 23:23

## 2018-04-13 RX ADMIN — SODIUM CHLORIDE SCH MG: 900 INJECTION, SOLUTION INTRAVENOUS at 00:21

## 2018-04-13 RX ADMIN — SODIUM CHLORIDE SCH MG: 900 INJECTION, SOLUTION INTRAVENOUS at 17:17

## 2018-04-13 RX ADMIN — Medication PRN MG: at 00:11

## 2018-04-13 RX ADMIN — POTASSIUM CHLORIDE SCH MEQ: 750 TABLET, FILM COATED, EXTENDED RELEASE ORAL at 17:16

## 2018-04-13 RX ADMIN — POTASSIUM CHLORIDE SCH MLS/HR: 200 INJECTION, SOLUTION INTRAVENOUS at 14:57

## 2018-04-13 RX ADMIN — SUCRALFATE SCH GM: 1 TABLET ORAL at 20:30

## 2018-04-13 RX ADMIN — LEVALBUTEROL HYDROCHLORIDE SCH MG: 1.25 SOLUTION RESPIRATORY (INHALATION) at 09:33

## 2018-04-13 RX ADMIN — SUCRALFATE SCH GM: 1 TABLET ORAL at 06:07

## 2018-04-13 RX ADMIN — LEVOTHYROXINE SODIUM SCH MG: 100 TABLET ORAL at 06:07

## 2018-04-13 RX ADMIN — SODIUM CHLORIDE SCH MG: 900 INJECTION, SOLUTION INTRAVENOUS at 08:29

## 2018-04-13 RX ADMIN — SUCRALFATE SCH GM: 1 TABLET ORAL at 11:22

## 2018-04-13 RX ADMIN — Medication SCH MG: at 08:28

## 2018-04-13 RX ADMIN — IMIPENEM AND CILASTATIN SODIUM SCH MLS/HR: 500; 500 INJECTION, POWDER, FOR SOLUTION INTRAVENOUS at 17:19

## 2018-04-13 RX ADMIN — POTASSIUM CHLORIDE SCH MLS/HR: 200 INJECTION, SOLUTION INTRAVENOUS at 06:37

## 2018-04-13 RX ADMIN — LEVALBUTEROL HYDROCHLORIDE SCH MG: 1.25 SOLUTION RESPIRATORY (INHALATION) at 13:34

## 2018-04-13 RX ADMIN — POTASSIUM CHLORIDE SCH MLS/HR: 200 INJECTION, SOLUTION INTRAVENOUS at 17:15

## 2018-04-13 RX ADMIN — LEVALBUTEROL HYDROCHLORIDE SCH MG: 1.25 SOLUTION RESPIRATORY (INHALATION) at 04:26

## 2018-04-13 RX ADMIN — PANTOPRAZOLE SODIUM SCH MG: 40 TABLET, DELAYED RELEASE ORAL at 08:28

## 2018-04-13 RX ADMIN — LEVALBUTEROL HYDROCHLORIDE SCH MG: 1.25 SOLUTION RESPIRATORY (INHALATION) at 17:10

## 2018-04-13 RX ADMIN — POTASSIUM CHLORIDE SCH MLS/HR: 200 INJECTION, SOLUTION INTRAVENOUS at 08:39

## 2018-04-13 NOTE — HOSPITALIST PROGRESS NOTE
Subjective


Progress Notes


Subjective


The patient is still having intermittent, asymptomatic PAC's and short runs of 

SVT.  She slept well o/n.





Physical Exam





Vital Signs








  Date Time  Temp Pulse Resp B/P (MAP) Pulse Ox O2 Delivery O2 Flow Rate FiO2


 


4/13/18 11:12  79      


 


4/13/18 09:53     95   


 


4/13/18 09:45   16     


 


4/13/18 09:38        40.0


 


4/13/18 09:34      Bi-PAP  


 


4/13/18 09:00    182/136 (151)    


 


4/13/18 08:45       7.0 


 


4/13/18 07:10 98.4       














Intake and Output 


 


 4/14/18





 07:00


 


Intake Total 980 ml


 


Output Total 875 ml


 


Balance 105 ml


 


 


 


Intake Oral 520 ml


 


IV Total 460 ml


 


Output Urine Total 875 ml


 


# Bowel Movements 1








General Appearance:  Alert, Awake, No Acute Distress


Cardiovascular:  Other (irregular, no m/r/g.)


Respiratory:  Other (Insp crackles in the right base and decreased BS in the 

right base)


Result Diagram:  


4/13/18 0508                                                                   

             4/13/18 0508








Assessment and Plan


Problems:  


(1) COPD with exacerbation


Status:  Acute


Assessment & Plan:  She does have a history of severe COPD and presented with 

increased shortness of breath. She is on treatment with nebulizers and IV Solu 

Medrol.   She was converted to oral prednisone on 04/11 but has had increased 

wheezing on 4/12, so placed back on IV steroids. She is getting nebulizer 

treatments every 4 hours. CXR today shows bibasilar infiltrates.  She is doing 

well enough to go to the medical floor.





(2) Right lower lobe pneumonia


Status:  Acute


Assessment & Plan:  Her chest x-rays have shown a developing infiltrate on the 

right.  On CXR from 04/12 she is noted to have bibasilar infiltrates. She had 

been on empiric treatment with ceftriaxone, but we also added azithromycin on 04 /11.  Cultures are not available.





(3) Acute systolic right heart failure


Assessment & Plan:  She did have an elevated BNP.  Her echocardiogram is 

reported to show a preserved ejection fraction, but pulmonary hypertension.   

Her weights are increased approximately 13kg since admission.  She is diuresing 

with prn Lasix.





(4) Upper GI bleed


Status:  Acute


Assessment & Plan:  The patient has a h/o PUD.  She had about 300cc of dark 

blood from the NG on 4/7.  Hgb did drop from 13.3 to 11.  Lovenox was stopped 

and she was given 2 units of FFP.  She is on Protonix and Carafate.  Hgb has 

been stable.





(5) Hypokalemia


Status:  Acute


Assessment & Plan:  Secondary to Lasix.  Replacing and then rechecking BMP.  Mg 

is wnl.





(6) Cardiorespiratory arrest


Status:  Acute


Assessment & Plan:  She did suffer a pulseless arrest on 4/7/18.  This was 

likely secondary to SVT with rates over 200 by telemetry report.  Echo shows an 

EF of 55%, and no wall motion abnormalities.  The troponins were in the 

borderline area and relatively unchanged from admission.





(7) Acute respiratory failure


Status:  Resolved


Assessment & Plan:  She was intubated on 4/7/18 during a code.  She extubated 

herself on 4/9.  She currently is wearing BiPAP intermittently.





(8) Supraventricular tachycardia


Status:  Acute


Assessment & Plan:  She was noted to be in a narrow complex tachycardia during 

her code event.  She converted to sinus rhythm after receiving adenosine.  She 

has continued to have short runs of SVT in the ICU.  She was placed on a 

diltiazem drip initially, but was converted to oral therapy on 04/11.





(9) Elevated troponin


Assessment & Plan:  She has had an elevated troponin, but these have not 

trended in a pattern consistent with infarction.  It is likely secondary to her 

chronic kidney disease.





(10) Hypertension


Status:  Chronic


Assessment & Plan:  She had been on diuretics, amlodipine and benazepril, which 

have been on hold.  We will follow her blood pressure after starting the 

diltiazem.





(11) Anxiety


Status:  Chronic


Assessment & Plan:  She has been on chronic fluoxetine and alprazolam.





(12) Depression


Status:  Chronic


Assessment & Plan:  She has been on the fluoxetine.





(13) Hypothyroidism


Status:  Chronic


Assessment & Plan:  She is on chronic treatment with Synthroid. 





(14) CKD (chronic kidney disease), stage III


Status:  Chronic





Exam


Sepsis Risk:  No Definite Risk





Problem Qualifiers





(1) Hypertension:  


Hypertension type:  essential hypertension  Qualified Codes:  I10 - Essential (

primary) hypertension








VICK BAUGH MD Apr 13, 2018 11:42

## 2018-04-14 VITALS — DIASTOLIC BLOOD PRESSURE: 92 MMHG | SYSTOLIC BLOOD PRESSURE: 170 MMHG

## 2018-04-14 VITALS — DIASTOLIC BLOOD PRESSURE: 102 MMHG | SYSTOLIC BLOOD PRESSURE: 160 MMHG

## 2018-04-14 VITALS — SYSTOLIC BLOOD PRESSURE: 159 MMHG | DIASTOLIC BLOOD PRESSURE: 67 MMHG

## 2018-04-14 VITALS — SYSTOLIC BLOOD PRESSURE: 163 MMHG | DIASTOLIC BLOOD PRESSURE: 75 MMHG

## 2018-04-14 VITALS — DIASTOLIC BLOOD PRESSURE: 93 MMHG | SYSTOLIC BLOOD PRESSURE: 162 MMHG

## 2018-04-14 VITALS — DIASTOLIC BLOOD PRESSURE: 87 MMHG | SYSTOLIC BLOOD PRESSURE: 144 MMHG

## 2018-04-14 LAB — PLATELET COUNT, AUTOMATED: 173 K/UL (ref 150–450)

## 2018-04-14 RX ADMIN — IMIPENEM AND CILASTATIN SODIUM SCH MLS/HR: 500; 500 INJECTION, POWDER, FOR SOLUTION INTRAVENOUS at 05:57

## 2018-04-14 RX ADMIN — LEVALBUTEROL HYDROCHLORIDE SCH MG: 1.25 SOLUTION RESPIRATORY (INHALATION) at 17:49

## 2018-04-14 RX ADMIN — FLUTICASONE PROPIONATE AND SALMETEROL SCH EACH: 50; 250 POWDER RESPIRATORY (INHALATION) at 09:17

## 2018-04-14 RX ADMIN — Medication SCH MG: at 09:22

## 2018-04-14 RX ADMIN — SUCRALFATE SCH GM: 1 TABLET ORAL at 11:38

## 2018-04-14 RX ADMIN — CEFDINIR SCH MG: 300 CAPSULE ORAL at 09:22

## 2018-04-14 RX ADMIN — SODIUM CHLORIDE SCH MG: 900 INJECTION, SOLUTION INTRAVENOUS at 00:21

## 2018-04-14 RX ADMIN — LEVALBUTEROL HYDROCHLORIDE SCH MG: 1.25 SOLUTION RESPIRATORY (INHALATION) at 05:13

## 2018-04-14 RX ADMIN — FUROSEMIDE SCH MG: 20 TABLET ORAL at 10:41

## 2018-04-14 RX ADMIN — CEFDINIR SCH MG: 300 CAPSULE ORAL at 20:50

## 2018-04-14 RX ADMIN — SUCRALFATE SCH GM: 1 TABLET ORAL at 16:38

## 2018-04-14 RX ADMIN — POTASSIUM CHLORIDE SCH MEQ: 750 TABLET, FILM COATED, EXTENDED RELEASE ORAL at 16:38

## 2018-04-14 RX ADMIN — POTASSIUM CHLORIDE SCH MEQ: 750 TABLET, FILM COATED, EXTENDED RELEASE ORAL at 09:22

## 2018-04-14 RX ADMIN — SUCRALFATE SCH GM: 1 TABLET ORAL at 20:51

## 2018-04-14 RX ADMIN — BENAZEPRIL HYDROCHLORIDE SCH MG: 20 TABLET ORAL at 09:22

## 2018-04-14 RX ADMIN — LEVALBUTEROL HYDROCHLORIDE SCH MG: 1.25 SOLUTION RESPIRATORY (INHALATION) at 09:17

## 2018-04-14 RX ADMIN — LEVALBUTEROL HYDROCHLORIDE SCH MG: 1.25 SOLUTION RESPIRATORY (INHALATION) at 13:18

## 2018-04-14 RX ADMIN — SUCRALFATE SCH GM: 1 TABLET ORAL at 06:00

## 2018-04-14 RX ADMIN — LEVOTHYROXINE SODIUM SCH MG: 100 TABLET ORAL at 05:56

## 2018-04-14 RX ADMIN — ACETAMINOPHEN PRN MG: 325 TABLET ORAL at 20:51

## 2018-04-14 RX ADMIN — FLUTICASONE PROPIONATE AND SALMETEROL SCH EACH: 50; 250 POWDER RESPIRATORY (INHALATION) at 17:49

## 2018-04-14 RX ADMIN — ACETAMINOPHEN PRN MG: 325 TABLET ORAL at 02:06

## 2018-04-14 RX ADMIN — PANTOPRAZOLE SODIUM SCH MG: 40 TABLET, DELAYED RELEASE ORAL at 09:22

## 2018-04-14 NOTE — HOSPITALIST PROGRESS NOTE
Subjective


Progress Notes


Subjective


This patient was admitted for COPD, but then developed SVT and was transferred 

to the emergency room.  She had no acute events overnight.





Patient Complains of:


Cardiovascular:  No: Chest Pain


Respiratory:  No: Shortness of Breath





Physical Exam





Vital Signs








  Date Time  Temp Pulse Resp B/P (MAP) Pulse Ox O2 Delivery O2 Flow Rate FiO2


 


4/14/18 09:36  85 24     


 


4/14/18 09:17     90 Nasal Cannula 2.0 


 


4/14/18 07:11 98.0   170/92 (118)    


 


4/13/18 23:29        40.0














Intake and Output 


 


 4/15/18





 07:00


 


Intake Total 290 ml


 


Balance 290 ml


 


 


 


Intake Oral 290 ml


 


# Bowel Movements 1








Neuro:  No Gross deficits


Eyes:  PERRLA


Cardiovascular:  Regular Rate and Rhythm


Respiratory:  Clear to Auscultation


Extremities:  No Edema


Integumentary:  No Cyanosis


Result Diagram:  


4/14/18 0543                                                                   

             4/14/18 0543





Imaging


Chest x-ray reviewed.





Assessment and Plan


Problems:  


(1) COPD with exacerbation


Status:  Acute


Assessment & Plan:  She does have a history of severe COPD and presented with 

increased shortness of breath. She is on treatment with nebulizers and IV Solu 

Medrol.   She has been receiving IV steroids, but we have switched her back to 

oral prednisone today.  We have also restarted her Advair.





(2) Right lower lobe pneumonia


Status:  Acute


Assessment & Plan:  Her chest x-ray revealed an infiltrate on the right.  She 

has been on treatment with ceftriaxone and azithromycin.  We converted her to 

oral cefdinir and azithromycin today.





(3) Acute systolic right heart failure


Assessment & Plan:  She did have an elevated BNP.  Her echocardiogram is 

reported to show a preserved ejection fraction, but pulmonary hypertension.   

Her weights remain elevated above her baseline.  We have started her on 

scheduled oral Lasix today.





(4) Upper GI bleed


Status:  Acute


Assessment & Plan:  The patient has a h/o PUD.  She had about 300cc of dark 

blood from the NG on 4/7.  Hgb did drop from 13.3 to 11.  Lovenox was stopped 

and she was given 2 units of FFP.  She is on Protonix and Carafate.  Hgb has 

been stable.





(5) Hypokalemia


Status:  Acute


Assessment & Plan:  She is on oral replacement.





(6) Cardiorespiratory arrest


Status:  Acute


Assessment & Plan:  She did suffer a pulseless arrest on 4/7/18.  This was 

likely secondary to SVT with rates over 200 by telemetry report.  Echo shows an 

EF of 55%, and no wall motion abnormalities.  The troponins were in the 

borderline area and relatively unchanged from admission.





(7) Acute respiratory failure


Status:  Resolved


Assessment & Plan:  She was intubated on 4/7/18 during a code.  She extubated 

herself on 4/9.  She currently is wearing BiPAP intermittently.





(8) Supraventricular tachycardia


Status:  Acute


Assessment & Plan:  She was noted to be in a narrow complex tachycardia during 

her code event.  She converted to sinus rhythm after receiving adenosine.  She 

has continued to have short runs of SVT in the ICU.  She was placed on a 

diltiazem drip initially, but was converted to oral therapy on 04/11.





(9) Elevated troponin


Assessment & Plan:  She has had an elevated troponin, but these have not 

trended in a pattern consistent with infarction.  It is likely secondary to her 

chronic kidney disease.





(10) Hypertension


Status:  Chronic


Assessment & Plan:  She had been on diuretics, amlodipine and benazepril, which 

have been on hold.  She has been started on diltiazem as above.  We have also 

restarted her benazepril today.





(11) Anxiety


Status:  Chronic


Assessment & Plan:  She has been on chronic fluoxetine and alprazolam.





(12) Depression


Status:  Chronic


Assessment & Plan:  She has been on the fluoxetine.





(13) Hypothyroidism


Status:  Chronic


Assessment & Plan:  She is on chronic treatment with Synthroid. 





(14) CKD (chronic kidney disease), stage III


Status:  Chronic





Exam


Sepsis Risk:  No Definite Risk





Problem Qualifiers





(1) Hypertension:  


Hypertension type:  essential hypertension  Qualified Codes:  I10 - Essential (

primary) hypertension








RUPESH MAYEN DO Apr 14, 2018 10:38

## 2018-04-15 VITALS — SYSTOLIC BLOOD PRESSURE: 138 MMHG | DIASTOLIC BLOOD PRESSURE: 75 MMHG

## 2018-04-15 VITALS — DIASTOLIC BLOOD PRESSURE: 90 MMHG | SYSTOLIC BLOOD PRESSURE: 129 MMHG

## 2018-04-15 VITALS — SYSTOLIC BLOOD PRESSURE: 156 MMHG | DIASTOLIC BLOOD PRESSURE: 72 MMHG

## 2018-04-15 VITALS — DIASTOLIC BLOOD PRESSURE: 100 MMHG | SYSTOLIC BLOOD PRESSURE: 155 MMHG

## 2018-04-15 VITALS — DIASTOLIC BLOOD PRESSURE: 92 MMHG | SYSTOLIC BLOOD PRESSURE: 155 MMHG

## 2018-04-15 VITALS — SYSTOLIC BLOOD PRESSURE: 152 MMHG | DIASTOLIC BLOOD PRESSURE: 103 MMHG

## 2018-04-15 VITALS — SYSTOLIC BLOOD PRESSURE: 164 MMHG | DIASTOLIC BLOOD PRESSURE: 96 MMHG

## 2018-04-15 RX ADMIN — SODIUM CHLORIDE SCH MG: 900 INJECTION, SOLUTION INTRAVENOUS at 11:59

## 2018-04-15 RX ADMIN — ACETAMINOPHEN PRN MG: 325 TABLET ORAL at 09:47

## 2018-04-15 RX ADMIN — FLUTICASONE PROPIONATE AND SALMETEROL SCH EACH: 50; 250 POWDER RESPIRATORY (INHALATION) at 05:02

## 2018-04-15 RX ADMIN — BENAZEPRIL HYDROCHLORIDE SCH MG: 20 TABLET ORAL at 08:51

## 2018-04-15 RX ADMIN — SPIRONOLACTONE SCH MG: 25 TABLET ORAL at 08:52

## 2018-04-15 RX ADMIN — ACETAMINOPHEN PRN MG: 325 TABLET ORAL at 03:12

## 2018-04-15 RX ADMIN — FUROSEMIDE SCH MG: 20 TABLET ORAL at 11:59

## 2018-04-15 RX ADMIN — LEVALBUTEROL HYDROCHLORIDE SCH MG: 1.25 SOLUTION RESPIRATORY (INHALATION) at 05:02

## 2018-04-15 RX ADMIN — LEVOTHYROXINE SODIUM SCH MG: 100 TABLET ORAL at 06:08

## 2018-04-15 RX ADMIN — CEFDINIR SCH MG: 300 CAPSULE ORAL at 21:19

## 2018-04-15 RX ADMIN — LEVALBUTEROL HYDROCHLORIDE SCH MG: 1.25 SOLUTION RESPIRATORY (INHALATION) at 17:19

## 2018-04-15 RX ADMIN — POTASSIUM CHLORIDE SCH MEQ: 750 TABLET, FILM COATED, EXTENDED RELEASE ORAL at 08:51

## 2018-04-15 RX ADMIN — PANTOPRAZOLE SODIUM SCH MG: 40 TABLET, DELAYED RELEASE ORAL at 08:52

## 2018-04-15 RX ADMIN — FLUTICASONE PROPIONATE AND SALMETEROL SCH EACH: 50; 250 POWDER RESPIRATORY (INHALATION) at 17:19

## 2018-04-15 RX ADMIN — CEFDINIR SCH MG: 300 CAPSULE ORAL at 08:51

## 2018-04-15 RX ADMIN — POTASSIUM CHLORIDE SCH MEQ: 750 TABLET, FILM COATED, EXTENDED RELEASE ORAL at 17:17

## 2018-04-15 RX ADMIN — POTASSIUM CHLORIDE SCH MLS/HR: 200 INJECTION, SOLUTION INTRAVENOUS at 07:22

## 2018-04-15 RX ADMIN — Medication SCH MG: at 08:51

## 2018-04-15 RX ADMIN — SUCRALFATE SCH GM: 1 TABLET ORAL at 06:08

## 2018-04-15 RX ADMIN — POTASSIUM CHLORIDE SCH MLS/HR: 200 INJECTION, SOLUTION INTRAVENOUS at 09:47

## 2018-04-15 RX ADMIN — LEVALBUTEROL HYDROCHLORIDE SCH MG: 1.25 SOLUTION RESPIRATORY (INHALATION) at 13:31

## 2018-04-15 RX ADMIN — ACETAMINOPHEN PRN MG: 325 TABLET ORAL at 21:19

## 2018-04-15 RX ADMIN — SODIUM CHLORIDE SCH MG: 900 INJECTION, SOLUTION INTRAVENOUS at 19:52

## 2018-04-15 RX ADMIN — LEVALBUTEROL HYDROCHLORIDE SCH MG: 1.25 SOLUTION RESPIRATORY (INHALATION) at 09:24

## 2018-04-15 NOTE — RADIOLOGY IMAGING REPORT
FACILITY: [facility] 

 

PATIENT NAME: Danica Zapata

: 1940

MR: [id number]

V: [id number]

EXAM DATE: 

ORDERING PHYSICIAN: VICK BAUGH

TECHNOLOGIST: 

 

Location: [facility]

Patient: Danica Zapata

: 1940

MRN: [id number]

Visit/Account:[id number]

Date of Sevice:  4/15/2018

 

ACCESSION #: [id number]

 

CHEST SINGLE AP

 

HISTORY: Pneumonia

 

COMPARISON: 2018

 

FINDINGS:

 

Cardiomediastinal contours: Normal

Lungs and pleura: Mild increasing bibasilar pleural-parenchymal opacities. No pneumothorax.

Bones/soft tissues: Normal

Other findings: None significant

 

IMPRESSION:

 

1. Mild increasing bibasilar pleural-parenchymal opacities. This may represent increasing pleural eff
usions with adjacent infiltrate/atelectasis.

 

Report Dictated By: Oscar Barnes MD at 4/15/2018 11:27 AM

 

Report E-Signed By: Oscar Barnes MD  at 4/15/2018 11:29 AM

 

WSN:[id number]

## 2018-04-15 NOTE — HOSPITALIST PROGRESS NOTE
Subjective


Progress Notes


Subjective


The patient reports sleeping well and has an appetite this morning.  She has 

noted occasional fluttering of her heart.





Physical Exam





Vital Signs








  Date Time  Temp Pulse Resp B/P (MAP) Pulse Ox O2 Delivery O2 Flow Rate FiO2


 


4/15/18 09:37  99 20     


 


4/15/18 09:24     93 Nasal Cannula 2.0 


 


4/15/18 06:59 98.2   138/75 (96)    


 


4/14/18 19:28        40.0














Intake and Output 


 


 4/16/18





 07:00


 


Intake Total 840 ml


 


Balance 840 ml


 


 


 


Intake Oral 840 ml








General Appearance:  Alert, Awake, Other (Mild work of breathing)


Cardiovascular:  Other (Regular with early beats.  Borderline tachycardic.)


Respiratory:  Other (Diffuse, bilateral exp wheezes.)


Result Diagram:  


4/14/18 0543                                                                   

             4/15/18 0517








Assessment and Plan


Problems:  


(1) COPD with exacerbation


Status:  Acute


Assessment & Plan:  She does have a history of severe COPD and presented with 

increased shortness of breath. She is on treatment with nebulizers and IV Solu 

Medrol.   She was converted to oral prednisone on 04/11 but has had increased 

wheezing on 4/12, so placed back on IV steroids. Now on prednisone.  She is 

getting nebulizer treatments 4 times daily with prn. CXR today shows bibasilar 

infiltrates.  We have also restarted her Advair.  She is more wheezy again 

today.  Repeat CXR pending.





(2) Right lower lobe pneumonia


Status:  Acute


Assessment & Plan:  Her chest x-ray revealed an infiltrate on the right.  She 

has been on treatment with ceftriaxone and azithromycin.  We converted her to 

oral cefdinir and azithromycin on 4/14.





(3) Heart failure with preserved left ventricular function (HFpEF)


Status:  Acute


Assessment & Plan:  She did have an elevated BNP.  Her echocardiogram is 

reported to show a preserved ejection fraction, but pulmonary hypertension.   

Her weights remain elevated above her baseline.  We have started her on 

scheduled oral Lasix starting on 4/14.  However, it will be held until her 

potassium is replaced today.  Also, will start spironolactone.





(4) Upper GI bleed


Status:  Acute


Assessment & Plan:  The patient has a h/o PUD.  She had about 300cc of dark 

blood from the NG on 4/7.  Hgb did drop from 13.3 to 11.  Lovenox was stopped 

and she was given 2 units of FFP.  She is on Protonix and Carafate.  Will stop 

Carafate.  Hgb has been stable.  On SCD for VTE prophylaxis.





(5) Hypokalemia


Status:  Acute


Assessment & Plan:  She is on oral replacement.  Adding spironolactone.  Will 

follow.





(6) Supraventricular tachycardia


Status:  Acute


Assessment & Plan:  She was noted to be in a narrow complex tachycardia during 

her code event.  She converted to sinus rhythm after receiving adenosine.  She 

has continued to have short runs of SVT in the ICU.  She was placed on a 

diltiazem drip initially, but was converted to oral therapy on 04/11.  Now 

titrating up the diltiazem.  Dr. Baugh spoke with Cardiology (Dr. Thornton) 

who agreed with the plan and recommended Cardiology follow up as an outpatient.

  He also recommended ASA, but because of her GI bleed, didn't think it was 

needed at this time or Plavix.





(7) Cardiorespiratory arrest


Status:  Acute


Assessment & Plan:  She did suffer a pulseless arrest on 4/7/18.  This was 

likely secondary to SVT with rates over 200 by telemetry report.  Echo shows an 

EF of 55%, and no wall motion abnormalities.  The troponins were in the 

borderline area and relatively unchanged from admission.





(8) Acute respiratory failure


Status:  Resolved


Assessment & Plan:  She was intubated on 4/7/18 during a code.  She extubated 

herself on 4/9.  She currently is wearing BiPAP intermittently.





(9) Elevated troponin


Assessment & Plan:  She has had an elevated troponin, but these have not 

trended in a pattern consistent with infarction.  It is likely secondary to her 

chronic kidney disease.





(10) Hypertension


Status:  Chronic


Assessment & Plan:  She had been on diuretics, amlodipine and benazepril, which 

have been on hold.  She has been started on diltiazem as above.  We have also 

restarted her benazepril.





(11) Anxiety


Status:  Chronic


Assessment & Plan:  She has been on chronic fluoxetine and alprazolam.





(12) Depression


Status:  Chronic


Assessment & Plan:  She has been on the fluoxetine.





(13) Hypothyroidism


Status:  Chronic


Assessment & Plan:  She is on chronic treatment with Synthroid. 





(14) CKD (chronic kidney disease), stage III


Status:  Chronic





Exam


Sepsis Risk:  No Definite Risk





Problem Qualifiers





(1) Hypertension:  


Hypertension type:  essential hypertension  Qualified Codes:  I10 - Essential (

primary) hypertension








VICK BAUGH MD Apr 15, 2018 09:53

## 2018-04-16 VITALS — SYSTOLIC BLOOD PRESSURE: 139 MMHG | DIASTOLIC BLOOD PRESSURE: 68 MMHG

## 2018-04-16 VITALS — SYSTOLIC BLOOD PRESSURE: 146 MMHG | DIASTOLIC BLOOD PRESSURE: 63 MMHG

## 2018-04-16 VITALS — SYSTOLIC BLOOD PRESSURE: 150 MMHG | DIASTOLIC BLOOD PRESSURE: 86 MMHG

## 2018-04-16 VITALS — SYSTOLIC BLOOD PRESSURE: 142 MMHG | DIASTOLIC BLOOD PRESSURE: 75 MMHG

## 2018-04-16 VITALS — DIASTOLIC BLOOD PRESSURE: 80 MMHG | SYSTOLIC BLOOD PRESSURE: 152 MMHG

## 2018-04-16 LAB — PLATELET COUNT, AUTOMATED: 162 K/UL (ref 150–450)

## 2018-04-16 RX ADMIN — LEVALBUTEROL HYDROCHLORIDE SCH MG: 1.25 SOLUTION RESPIRATORY (INHALATION) at 09:04

## 2018-04-16 RX ADMIN — SODIUM CHLORIDE SCH MG: 900 INJECTION, SOLUTION INTRAVENOUS at 13:26

## 2018-04-16 RX ADMIN — DILTIAZEM HYDROCHLORIDE SCH MG: 120 CAPSULE, EXTENDED RELEASE ORAL at 20:33

## 2018-04-16 RX ADMIN — BENAZEPRIL HYDROCHLORIDE SCH MG: 20 TABLET ORAL at 08:26

## 2018-04-16 RX ADMIN — PANTOPRAZOLE SODIUM SCH MG: 40 TABLET, DELAYED RELEASE ORAL at 08:26

## 2018-04-16 RX ADMIN — SPIRONOLACTONE SCH MG: 25 TABLET ORAL at 08:25

## 2018-04-16 RX ADMIN — POTASSIUM CHLORIDE SCH MEQ: 750 TABLET, FILM COATED, EXTENDED RELEASE ORAL at 08:26

## 2018-04-16 RX ADMIN — FLUTICASONE PROPIONATE AND SALMETEROL SCH EACH: 50; 250 POWDER RESPIRATORY (INHALATION) at 17:00

## 2018-04-16 RX ADMIN — CEFDINIR SCH MG: 300 CAPSULE ORAL at 20:33

## 2018-04-16 RX ADMIN — LEVALBUTEROL HYDROCHLORIDE SCH MG: 1.25 SOLUTION RESPIRATORY (INHALATION) at 17:00

## 2018-04-16 RX ADMIN — FUROSEMIDE SCH MG: 20 TABLET ORAL at 08:25

## 2018-04-16 RX ADMIN — LEVALBUTEROL HYDROCHLORIDE SCH MG: 1.25 SOLUTION RESPIRATORY (INHALATION) at 05:12

## 2018-04-16 RX ADMIN — CEFDINIR SCH MG: 300 CAPSULE ORAL at 08:26

## 2018-04-16 RX ADMIN — LEVALBUTEROL HYDROCHLORIDE SCH MG: 1.25 SOLUTION RESPIRATORY (INHALATION) at 13:11

## 2018-04-16 RX ADMIN — SODIUM CHLORIDE SCH MG: 900 INJECTION, SOLUTION INTRAVENOUS at 04:05

## 2018-04-16 RX ADMIN — Medication SCH MG: at 08:26

## 2018-04-16 RX ADMIN — FLUTICASONE PROPIONATE AND SALMETEROL SCH EACH: 50; 250 POWDER RESPIRATORY (INHALATION) at 05:12

## 2018-04-16 RX ADMIN — LEVOTHYROXINE SODIUM SCH MG: 100 TABLET ORAL at 05:56

## 2018-04-16 RX ADMIN — POTASSIUM CHLORIDE SCH MEQ: 750 TABLET, FILM COATED, EXTENDED RELEASE ORAL at 17:23

## 2018-04-16 RX ADMIN — SODIUM CHLORIDE SCH MG: 900 INJECTION, SOLUTION INTRAVENOUS at 20:34

## 2018-04-16 NOTE — ANTIMICROBIAL STEWARDSHIP
Antimicrobial Time Out


Antimicrobial Stewardship


MD Service:  Hospitalist


Indications:  HAP, Other (s/p intubation)


Antimicrobial Used


Ceftriaxone and Azithromycin, changed to azithromycin and imipenem/cilastatin


Start Date:  Apr 11, 2018


Culture Results:  N/A





Eligible for PO Conversion


Eligable for PO Conversion:  Yes





Reviewed with Provider


Reviewed w/ Provider on Rounds:  Yes


Date Reviewed w/ Provider:  Apr 16, 2018





Comments


Comments


78 yo F with significant COPD developed R basilar infiltrate s/p extubation.  

Coverage broadened on 4/11.  Azithromycin added on 4/11 and imipenem added on 4/ 12.  Has been de-escalated to oral azithromycin and oral cefdinir.  Today is 

day 6 based on development of infiltrate on 4/11, continue abx for 10 days.  

Discussed with MD.





Elizabeth Naik, PharmD, BCOP











ELIZABETH NAIK Apr 12, 2018 09:17

## 2018-04-16 NOTE — HOSPITALIST PROGRESS NOTE
Subjective


Progress Notes


Subjective


The patient is feeling better overall. Sitting at the bedside eating breakfast. 

She notes that any time she moves, her heart rate jumps up and she feels short 

of breath.





Physical Exam





Vital Signs








  Date Time  Temp Pulse Resp B/P (MAP) Pulse Ox O2 Delivery O2 Flow Rate FiO2


 


4/16/18 07:27  111      


 


4/16/18 07:16     94 High-Flow Nasal Cannula 2.0 


 


4/16/18 07:08 98.0  16 146/63 (90)    


 


4/16/18 05:59        35.0








General Appearance:  Alert, Awake, No Acute Distress, Afebrile


Neuro:  No Gross deficits


Cardiovascular:  Other (Irregularly irregular, tachycardic.)


Respiratory:  Other (Decreased breath sounds with squeaks and wheezes scattered 

through.)


GI:  Soft and Non-Tender


Extremities:  Warm, Perfused


Psych:  Appropriate Mood & Affect


Result Diagram:  


4/16/18 0535                                                                   

             4/16/18 0535








Assessment and Plan


Problems:  


(1) COPD with exacerbation


Status:  Acute


Assessment & Plan:  She does have a history of severe COPD and presented with 

increased shortness of breath. She is on treatment with nebulizers and IV Solu 

Medrol. She was converted to oral prednisone on 2 occasions but her wheezing 

worsened and she had to be placed back on IV solumedrol.  She is getting 

nebulizer treatments 4 times daily with prns as well. CXR 04/15 continues to 

show bibasilar infiltrates. Her O2 requirements have decreased and she is 

currently on 2 L per NC. She does wear BiPAP at HS.





(2) Right lower lobe pneumonia


Status:  Acute


Assessment & Plan:  Her chest x-ray revealed an infiltrate on the right.  She 

has been on treatment with ceftriaxone and azithromycin.  We converted her to 

oral cefdinir and azithromycin on 4/14.





(3) Heart failure with preserved left ventricular function (HFpEF)


Status:  Acute


Assessment & Plan:  She did have an elevated BNP.  Her echocardiogram is 

reported to show a preserved ejection fraction, but pulmonary hypertension.   

Her weights remain elevated above her baseline.  We have started her on 

scheduled oral Lasix starting on 4/14.  However, it will be held until her 

potassium is replaced today.  Also, will start spironolactone.





(4) Upper GI bleed


Status:  Acute


Assessment & Plan:  The patient has a h/o PUD.  She had about 300cc of dark 

blood from the NG on 4/7.  Hgb did drop from 13.3 to 11.  Lovenox was stopped 

and she was given 2 units of FFP.  She is on Protonix and Carafate.  Will stop 

Carafate.  Hgb has been stable.  On SCD for VTE prophylaxis.





(5) Hypokalemia


Status:  Acute


Assessment & Plan:  She is on oral replacement.  Adding spironolactone.  Will 

follow.





(6) Supraventricular tachycardia


Status:  Acute


Assessment & Plan:  She was noted to be in a narrow complex tachycardia during 

her code event.  She converted to sinus rhythm after receiving adenosine.  She 

has continued to have short runs of SVT in the ICU.  She was placed on a 

diltiazem drip initially, but was converted to oral therapy on 04/11.  Now 

titrating up the diltiazem.  Dr. Abreu spoke with Cardiology (Dr. Thornton) 

who agreed with the plan and recommended Cardiology follow up as an outpatient.

  He also recommended ASA, but because of her GI bleed, didn't think it was 

needed at this time or Plavix. She continues to have elevated heart rates 

briefly as high as 160s with any exertion. Will talk with cardio again about 

next step for controlling her heart rate. Her oral diltiazem is at 360mg daily.





(7) Cardiorespiratory arrest


Status:  Acute


Assessment & Plan:  She did suffer a pulseless arrest on 4/7/18.  This was 

likely secondary to SVT with rates over 200 by telemetry report.  Echo shows an 

EF of 55%, and no wall motion abnormalities.  The troponins were in the 

borderline area and relatively unchanged from admission.





(8) Acute respiratory failure


Status:  Resolved


Assessment & Plan:  She was intubated on 4/7/18 during a code.  She extubated 

herself on 4/9.  She currently is wearing BiPAP intermittently.





(9) Elevated troponin


Assessment & Plan:  She has had an elevated troponin, but these have not 

trended in a pattern consistent with infarction.  It is likely secondary to her 

chronic kidney disease.





(10) Hypertension


Status:  Chronic


Assessment & Plan:  She had been on diuretics, amlodipine and benazepril, which 

have been on hold.  She has been started on diltiazem as above.  We have also 

restarted her benazepril.





(11) Anxiety


Status:  Chronic


Assessment & Plan:  She has been on chronic fluoxetine and alprazolam.





(12) Depression


Status:  Chronic


Assessment & Plan:  She has been on the fluoxetine.





(13) Hypothyroidism


Status:  Chronic


Assessment & Plan:  She is on chronic treatment with Synthroid. 





(14) CKD (chronic kidney disease), stage III


Status:  Chronic


Assessment & Plan:  Her current creatinine is 1.0. Her BUN is elevated likely 

due to diuresis. Will continue to monitor closely.





Time Spent on Plan of Care:  < 30 min





Exam


Sepsis Risk:  Sepsis Risk





Problem Qualifiers





(1) Hypertension:  


Hypertension type:  essential hypertension  Qualified Codes:  I10 - Essential (

primary) hypertension








GABBY RODRIGUEZ MD Apr 16, 2018 08:37

## 2018-04-16 NOTE — MEDICAL NUTRITION THERAPY
Nutrition Anthropometrics


Height (Inches):  64.00


Height (Calculated Centimeters:  162.257904


Weight (Pounds):  182


Weight (Calculated Kilograms):  82.554


BMI Calculated:  27.63


Gordon Nutrition Score:         Probably Inadequate 


Gordon Nutrition Risk Score:  16


Dietary Referral


Nutrition Risk Factors:      


Nutrition Risk Comment:





Nutritional Diagnosis


Nutritional Risk Acuity 3:  COPD Unstable


Past Medical History:  


HX of depression, anxiety, HTN, and GI bleed/ulcer, CKD- 3, COPD


Nutritional Acuity:  3-Mild


Nutrition Diagnosis:  Increased Nutrient Needs


Nutrition Etiology:  Inadeq. Food/Harper Intake


Nutrition Problem/Etiology/Sym:  


increased nutrient needs related to COPD with exacerbation on BiPAP during


the day.


Energy Requirement:  1860 (kcal/day (23 kcal/kg)- [place] St Jeor RMR (1292) AF 

1.2, IF 1.2)


Protein Requirement:  82 (g protein/day (1.0 g/kg))


Fluid Requirement:  2470 (mL/day (30 mL/kg))


Diet Type:  Diet as Tolerated ANA/REG


Nutrition Intervention:  Cont diet as ordered, Encourage intake


Food Likes:  likes mighty shakes


Food Dislikes:  Dislikes chocolate ensure


Diet Comment To RSA:  


OFFER [facility]ANILLA NUTRITION SUPPLEMENT





Nutrition Monitoring & Eval


RD Patient Assessment Time:  30 minutes


RD Assessment Type:  RD Re-Assessment


Patient Nutrition Acuity:  2-Moderate


Follow Up Date:  Apr 19, 2018


Nutritional Comment:  


Pt admitted for COPD exacerbation.   Pt on regualr diet but no intake  


reported at this time.  BNP elevated at 650.  BMI is in overwt range.   


Will cont to monitor and encourage intake.





4/8  Pt transferred to ICU after cardiac arrest episode.  , Alb  


2.9, High AST/ALT.  Pt NPO d/t intubation and sedation.  Plan to wean  


today.  Follow clinical progress, labs, etc.





4/10 Pt remains admitted in ICU on BiPAP. Pt diet advanced to ANA. Pt


currently tolerating clear liquids. BUN elevated at 42. Albumin was low


but is increasing. Pt intakes not currently meeting estimated need.


Continue to monitor and encourage increased intake.





4/12 Pt diet as tolerated. Recorded intakes report pt continues to only


consume liquids at this time. Writer visited pt room. Pt consuming eggs


with toast. Writer encouraged pt to consume protein on each meal tray to


encourage strength and healing. Pt reports that she has tried chocolate


ensure and dislikes the flavor but is willing to try vanilla ensure.


Writer will continue to monitor pt intake and progress.





4/16 Pt continues on diet as tolerated consuming % of all meals. Pt


states that she has been trying to increase her intake and consume a


protein on each meal tray. Pt states that she does not love the vanilla


ensure but has been tolerating drinking some of the ensure. Writer


encouraged pt to continue to drink ensure to increase protein intake to


accommodate for increased needs for healing. Continue to monitor pt intake


and clinical progress.











PEDRO RODRIGUEZ Apr 16, 2018 13:57

## 2018-04-17 VITALS — SYSTOLIC BLOOD PRESSURE: 149 MMHG | DIASTOLIC BLOOD PRESSURE: 103 MMHG

## 2018-04-17 VITALS — DIASTOLIC BLOOD PRESSURE: 58 MMHG | SYSTOLIC BLOOD PRESSURE: 132 MMHG

## 2018-04-17 VITALS — DIASTOLIC BLOOD PRESSURE: 64 MMHG | SYSTOLIC BLOOD PRESSURE: 132 MMHG

## 2018-04-17 VITALS — DIASTOLIC BLOOD PRESSURE: 66 MMHG | SYSTOLIC BLOOD PRESSURE: 147 MMHG

## 2018-04-17 VITALS — SYSTOLIC BLOOD PRESSURE: 157 MMHG | DIASTOLIC BLOOD PRESSURE: 71 MMHG

## 2018-04-17 VITALS — SYSTOLIC BLOOD PRESSURE: 142 MMHG | DIASTOLIC BLOOD PRESSURE: 57 MMHG

## 2018-04-17 LAB — PLATELET COUNT, AUTOMATED: 168 K/UL (ref 150–450)

## 2018-04-17 RX ADMIN — SPIRONOLACTONE SCH MG: 25 TABLET ORAL at 08:50

## 2018-04-17 RX ADMIN — FUROSEMIDE SCH MG: 20 TABLET ORAL at 08:50

## 2018-04-17 RX ADMIN — LEVALBUTEROL HYDROCHLORIDE SCH MG: 1.25 SOLUTION RESPIRATORY (INHALATION) at 14:00

## 2018-04-17 RX ADMIN — LEVALBUTEROL HYDROCHLORIDE SCH MG: 1.25 SOLUTION RESPIRATORY (INHALATION) at 17:07

## 2018-04-17 RX ADMIN — PANTOPRAZOLE SODIUM SCH MG: 40 TABLET, DELAYED RELEASE ORAL at 08:51

## 2018-04-17 RX ADMIN — FLUTICASONE PROPIONATE AND SALMETEROL SCH EACH: 50; 250 POWDER RESPIRATORY (INHALATION) at 17:07

## 2018-04-17 RX ADMIN — CEFDINIR SCH MG: 300 CAPSULE ORAL at 21:32

## 2018-04-17 RX ADMIN — SODIUM CHLORIDE SCH MG: 900 INJECTION, SOLUTION INTRAVENOUS at 04:08

## 2018-04-17 RX ADMIN — DILTIAZEM HYDROCHLORIDE SCH MG: 120 CAPSULE, EXTENDED RELEASE ORAL at 08:48

## 2018-04-17 RX ADMIN — FLUTICASONE PROPIONATE AND SALMETEROL SCH EACH: 50; 250 POWDER RESPIRATORY (INHALATION) at 05:35

## 2018-04-17 RX ADMIN — CEFDINIR SCH MG: 300 CAPSULE ORAL at 08:49

## 2018-04-17 RX ADMIN — DILTIAZEM HYDROCHLORIDE SCH MG: 120 CAPSULE, EXTENDED RELEASE ORAL at 21:32

## 2018-04-17 RX ADMIN — SODIUM CHLORIDE SCH MG: 900 INJECTION, SOLUTION INTRAVENOUS at 15:18

## 2018-04-17 RX ADMIN — ACETAMINOPHEN PRN MG: 325 TABLET ORAL at 21:32

## 2018-04-17 RX ADMIN — LEVALBUTEROL HYDROCHLORIDE SCH MG: 1.25 SOLUTION RESPIRATORY (INHALATION) at 05:35

## 2018-04-17 RX ADMIN — LEVOTHYROXINE SODIUM SCH MG: 100 TABLET ORAL at 06:09

## 2018-04-17 RX ADMIN — BENAZEPRIL HYDROCHLORIDE SCH MG: 20 TABLET ORAL at 08:47

## 2018-04-17 RX ADMIN — POTASSIUM CHLORIDE SCH MEQ: 750 TABLET, FILM COATED, EXTENDED RELEASE ORAL at 08:45

## 2018-04-17 RX ADMIN — LEVALBUTEROL HYDROCHLORIDE SCH MG: 1.25 SOLUTION RESPIRATORY (INHALATION) at 09:36

## 2018-04-17 RX ADMIN — POTASSIUM CHLORIDE SCH MEQ: 750 TABLET, FILM COATED, EXTENDED RELEASE ORAL at 17:46

## 2018-04-17 NOTE — HOSPITALIST PROGRESS NOTE
Subjective


Progress Notes


Subjective


She reports slight improvements in activity tolerance. HR has been better 

controlled.





Physical Exam





Vital Signs








  Date Time  Temp Pulse Resp B/P (MAP) Pulse Ox O2 Delivery O2 Flow Rate FiO2


 


4/17/18 11:20 98.1 89 22 149/103 (118) 94 High-Flow Nasal Cannula 2.0 


 


4/16/18 09:05        35.0














Intake and Output 


 


 4/18/18





 07:00


 


Intake Total 360 ml


 


Output Total 350 ml


 


Balance 10 ml


 


 


 


Intake Oral 360 ml


 


Output Urine Total 350 ml








General Appearance:  Alert, Awake


Neuro:  No Gross deficits


Cardiovascular:  Other (Regular with fairly frequent ectopy)


Respiratory:  Other (decreased breath sounds with bilateral wheezing/prolonged 

expiratory phase)


GI:  Soft and Non-Tender


Extremities:  Warm, Perfused


Psych:  Alert & Oriented X3


Result Diagram:  


4/17/18 0543                                                                   

             4/17/18 0543








Assessment and Plan


Problems:  


(1) COPD with exacerbation


Status:  Acute


Assessment & Plan:  She does have a history of severe COPD and presented with 

increased shortness of breath. She is on treatment with nebulizers and IV Solu-

Medrol. She was converted to oral prednisone on 2 occasions, but her wheezing 

worsened and she had to be placed back on IV steroids.  She is getting 

nebulizer treatments 4 times daily with as needed as well. CXR 4/15 continued 

to show bibasilar infiltrates. Her O2 requirements have decreased and she is 

currently on 2L per NC. She does wear BiPAP at HS. We will start mobilizing.





(2) Right lower lobe pneumonia


Status:  Acute


Assessment & Plan:  Her chest x-ray revealed an infiltrate on the right.  She 

has been on treatment with ceftriaxone and azithromycin.  We converted her to 

oral cefdinir and azithromycin on 4/14.





(3) Heart failure with preserved left ventricular function (HFpEF)


Status:  Acute


Assessment & Plan:  She did have an elevated BNP.  Her echocardiogram showed a 

preserved ejection fraction, but pulmonary hypertension.   Her weights remain 

elevated above her baseline.  We started her on scheduled oral Lasix initially 

and added spironolactone. Will now stop the Lasix, watch I/Os and weights, as 

well as labs. 





(4) Upper GI bleed


Status:  Acute


Assessment & Plan:  The patient has a h/o PUD.  She had about 300cc of dark 

blood from the NG on 4/7.  Hgb did drop from 13.3 to 11.  Lovenox was stopped 

and she was given 2 units of FFP.  She was on Protonix and Carafate (we stopped 

Carafate).  Hgb has been stable.  On SCD for VTE prophylaxis.





(5) Hypokalemia


Status:  Acute


Assessment & Plan:  She is on oral replacement and added spironolactone.  Will 

follow labs.





(6) Supraventricular tachycardia


Status:  Acute


Assessment & Plan:  She was noted to be in a narrow complex tachycardia during 

her code event.  She converted to sinus rhythm after receiving adenosine.  She 

continued to have short runs of SVT in the ICU.  She was placed on a diltiazem 

drip initially, but was converted to oral therapy on 4/11.  Dr. Abreu spoke 

with Cardiology (Dr. Thornton) who agreed with the plan and recommended 

Cardiology follow up as an outpatient.  He also recommended ASA, but because of 

her GI bleed, didn't think it was needed at this time (or Plavix). She 

continues to have some ectopy, but overall HR is improved. She is currently on 

max dose diltiazem ER 240mg BID.





(7) Cardiorespiratory arrest


Status:  Acute


Assessment & Plan:  She did suffer a pulseless arrest on 4/7/18.  This was 

likely secondary to SVT with rates over 200 by telemetry report.  Echo shows an 

EF of 55%, and no wall motion abnormalities.  The troponins were in the 

borderline area and relatively unchanged from admission.





(8) Acute respiratory failure


Status:  Resolved


Assessment & Plan:  She was intubated on 4/7/18 during a code.  She extubated 

herself on 4/9.  She currently is wearing BiPAP intermittently.





(9) Elevated troponin


Assessment & Plan:  She has had an elevated troponin, but these have not 

trended in a pattern consistent with infarction.  





(10) Hypertension


Status:  Chronic


Assessment & Plan:  She had been on diuretics, amlodipine and benazepril, which 

had been on hold.  She has been started on diltiazem as above.  We have also 

now restarted her benazepril.





(11) Anxiety


Status:  Chronic


Assessment & Plan:  She has been on chronic fluoxetine and alprazolam.





(12) Depression


Status:  Chronic


Assessment & Plan:  She has been on the fluoxetine.





(13) Hypothyroidism


Status:  Chronic


Assessment & Plan:  She is on chronic treatment with Synthroid. 





(14) CKD (chronic kidney disease), stage III


Status:  Chronic


Assessment & Plan:  Her current creatinine is 1.0. Her BUN is elevated likely 

due to diuresis. Will continue to monitor closely.








Exam


Sepsis Risk:  Sepsis Risk





Problem Qualifiers





(1) Hypertension:  


Hypertension type:  essential hypertension  Qualified Codes:  I10 - Essential (

primary) hypertension








ANDRE RODRIGUEZ MD Apr 17, 2018 13:19

## 2018-04-18 VITALS — SYSTOLIC BLOOD PRESSURE: 139 MMHG | DIASTOLIC BLOOD PRESSURE: 65 MMHG

## 2018-04-18 VITALS — SYSTOLIC BLOOD PRESSURE: 138 MMHG | DIASTOLIC BLOOD PRESSURE: 65 MMHG

## 2018-04-18 VITALS — SYSTOLIC BLOOD PRESSURE: 143 MMHG | DIASTOLIC BLOOD PRESSURE: 64 MMHG

## 2018-04-18 VITALS — DIASTOLIC BLOOD PRESSURE: 72 MMHG | SYSTOLIC BLOOD PRESSURE: 129 MMHG

## 2018-04-18 VITALS — DIASTOLIC BLOOD PRESSURE: 63 MMHG | SYSTOLIC BLOOD PRESSURE: 135 MMHG

## 2018-04-18 LAB — PLATELET COUNT, AUTOMATED: 155 K/UL (ref 150–450)

## 2018-04-18 RX ADMIN — CEFDINIR SCH MG: 300 CAPSULE ORAL at 09:55

## 2018-04-18 RX ADMIN — LEVALBUTEROL HYDROCHLORIDE SCH MG: 1.25 SOLUTION RESPIRATORY (INHALATION) at 05:50

## 2018-04-18 RX ADMIN — SODIUM CHLORIDE SCH MG: 900 INJECTION, SOLUTION INTRAVENOUS at 04:21

## 2018-04-18 RX ADMIN — DILTIAZEM HYDROCHLORIDE SCH MG: 120 CAPSULE, EXTENDED RELEASE ORAL at 21:41

## 2018-04-18 RX ADMIN — LEVALBUTEROL HYDROCHLORIDE SCH MG: 1.25 SOLUTION RESPIRATORY (INHALATION) at 16:49

## 2018-04-18 RX ADMIN — ACETAMINOPHEN PRN MG: 325 TABLET ORAL at 21:41

## 2018-04-18 RX ADMIN — FUROSEMIDE SCH MG: 20 TABLET ORAL at 09:55

## 2018-04-18 RX ADMIN — LEVOTHYROXINE SODIUM SCH MG: 100 TABLET ORAL at 05:33

## 2018-04-18 RX ADMIN — FLUTICASONE PROPIONATE AND SALMETEROL SCH EACH: 50; 250 POWDER RESPIRATORY (INHALATION) at 16:49

## 2018-04-18 RX ADMIN — POTASSIUM CHLORIDE SCH MEQ: 750 TABLET, FILM COATED, EXTENDED RELEASE ORAL at 09:55

## 2018-04-18 RX ADMIN — SPIRONOLACTONE SCH MG: 25 TABLET ORAL at 09:55

## 2018-04-18 RX ADMIN — POTASSIUM CHLORIDE SCH MEQ: 750 TABLET, FILM COATED, EXTENDED RELEASE ORAL at 17:24

## 2018-04-18 RX ADMIN — PANTOPRAZOLE SODIUM SCH MG: 40 TABLET, DELAYED RELEASE ORAL at 09:55

## 2018-04-18 RX ADMIN — DILTIAZEM HYDROCHLORIDE SCH MG: 120 CAPSULE, EXTENDED RELEASE ORAL at 09:55

## 2018-04-18 RX ADMIN — CEFDINIR SCH MG: 300 CAPSULE ORAL at 21:41

## 2018-04-18 RX ADMIN — FLUTICASONE PROPIONATE AND SALMETEROL SCH EACH: 50; 250 POWDER RESPIRATORY (INHALATION) at 05:50

## 2018-04-18 RX ADMIN — SODIUM CHLORIDE SCH MG: 900 INJECTION, SOLUTION INTRAVENOUS at 16:23

## 2018-04-18 RX ADMIN — LEVALBUTEROL HYDROCHLORIDE SCH MG: 1.25 SOLUTION RESPIRATORY (INHALATION) at 09:09

## 2018-04-18 RX ADMIN — LEVALBUTEROL HYDROCHLORIDE SCH MG: 1.25 SOLUTION RESPIRATORY (INHALATION) at 13:16

## 2018-04-18 NOTE — HOSPITALIST PROGRESS NOTE
Subjective


Progress Notes


Subjective


Overall feeling better with less SOB.





Physical Exam





Vital Signs








  Date Time  Temp Pulse Resp B/P (MAP) Pulse Ox O2 Delivery O2 Flow Rate FiO2


 


4/18/18 13:20  78 18     


 


4/18/18 13:14     94 Nasal Cannula 3.5 


 


4/18/18 11:06 98.3   143/64 (90)    


 


4/18/18 09:18        35.0














Intake and Output 


 


 4/19/18





 07:00


 


Intake Total 0 ml


 


Balance 0 ml


 


 


 


Intake Oral 0 ml








General Appearance:  Alert, Awake, Other (mild work of breathing)


Respiratory:  Other (diffuse exp wheezes bilaterally)


Extremities:  Edema (1+ pitting in prox/post thighs)


Result Diagram:  


4/18/18 0532                                                                   

             4/18/18 0532








Assessment and Plan


Problems:  


(1) COPD with exacerbation


Status:  Acute


Assessment & Plan:  She does have a history of severe COPD and presented with 

increased shortness of breath. She is on treatment with nebulizers and IV Solu-

Medrol. She was converted to oral prednisone on 2 occasions, but her wheezing 

worsened and she had to be placed back on IV steroids.  She is getting 

nebulizer treatments 4 times daily with as needed as well. CXR 4/15 continued 

to show bibasilar infiltrates. Her O2 requirements have decreased and she is 

currently on 2L per NC. She does wear BiPAP at HS. We will start mobilizing.





(2) Right lower lobe pneumonia


Status:  Acute


Assessment & Plan:  Her chest x-ray revealed an infiltrate on the right.  She 

has been on treatment with ceftriaxone and azithromycin.  We converted her to 

oral cefdinir and azithromycin on 4/14.





(3) Heart failure with preserved left ventricular function (HFpEF)


Status:  Resolved


Assessment & Plan:  She did have an elevated BNP.  Her echocardiogram showed a 

preserved ejection fraction, but pulmonary hypertension.   Her weights remain 

elevated above her baseline.  We started her on scheduled oral Lasix initially 

and added spironolactone.  Continue daily wts.  





(4) Upper GI bleed


Status:  Acute


Assessment & Plan:  The patient has a h/o PUD.  She had about 300cc of dark 

blood from the NG on 4/7.  Hgb did drop from 13.3 to 11.  Lovenox was stopped 

and she was given 2 units of FFP.  She was on Protonix and Carafate (we stopped 

Carafate).  Hgb has been stable.  On SCD for VTE prophylaxis.





(5) Hypokalemia


Status:  Acute


Assessment & Plan:  She is on oral replacement and added spironolactone.  Will 

follow labs.





(6) Supraventricular tachycardia


Status:  Acute


Assessment & Plan:  She was noted to be in a narrow complex tachycardia during 

her code event.  She converted to sinus rhythm after receiving adenosine.  She 

continued to have short runs of SVT in the ICU.  She was placed on a diltiazem 

drip initially, but was converted to oral therapy on 4/11.  Dr. Baugh spoke 

with Cardiology (Dr. Thornton) who agreed with the plan and recommended 

Cardiology follow up as an outpatient.  He also recommended ASA, but because of 

her GI bleed, didn't think it was needed at this time (or Plavix). She 

continues to have some ectopy, but overall HR is improved. She is currently on 

max dose diltiazem ER 240mg BID.





(7) Cardiorespiratory arrest


Status:  Acute


Assessment & Plan:  She did suffer a pulseless arrest on 4/7/18.  This was 

likely secondary to SVT with rates over 200 by telemetry report.  Echo shows an 

EF of 55%, and no wall motion abnormalities.  The troponins were in the 

borderline area and relatively unchanged from admission.





(8) Acute respiratory failure


Status:  Resolved


Assessment & Plan:  She was intubated on 4/7/18 during a code.  She extubated 

herself on 4/9.  She currently is wearing BiPAP intermittently.





(9) Elevated troponin


Assessment & Plan:  She has had an elevated troponin, but these have not 

trended in a pattern consistent with infarction.  





(10) Hypertension


Status:  Chronic


Assessment & Plan:  She had been on diuretics, amlodipine and benazepril, which 

had been on hold.  She has been started on diltiazem as above.  We had 

restarted her benazepril, but will stop because of increasing creatinine and 

need for aggressive diuresis.





(11) Anxiety


Status:  Chronic


Assessment & Plan:  She has been on chronic fluoxetine and alprazolam.





(12) Depression


Status:  Chronic


Assessment & Plan:  She has been on the fluoxetine.





(13) Hypothyroidism


Status:  Chronic


Assessment & Plan:  She is on chronic treatment with Synthroid. 





(14) CKD (chronic kidney disease), stage III


Status:  Chronic


Assessment & Plan:  Her current creatinine is 1.1. Her BUN is elevated likely 

due to diuresis. Will continue to monitor closely.








Exam


Sepsis Risk:  No Definite Risk





Problem Qualifiers





(1) Hypertension:  


Hypertension type:  essential hypertension  Qualified Codes:  I10 - Essential (

primary) hypertension








VICK BAUGH MD Apr 18, 2018 14:26

## 2018-04-19 ENCOUNTER — HOSPITAL ENCOUNTER (INPATIENT)
Dept: HOSPITAL 89 - ECF | Age: 78
LOS: 15 days | Discharge: HOME | DRG: 190 | End: 2018-05-04
Attending: INTERNAL MEDICINE | Admitting: INTERNAL MEDICINE
Payer: MEDICARE

## 2018-04-19 VITALS — DIASTOLIC BLOOD PRESSURE: 59 MMHG | SYSTOLIC BLOOD PRESSURE: 117 MMHG

## 2018-04-19 VITALS
HEIGHT: 64 IN | BODY MASS INDEX: 26.82 KG/M2 | WEIGHT: 157.12 LBS | HEIGHT: 64 IN | BODY MASS INDEX: 26.82 KG/M2 | WEIGHT: 157.12 LBS

## 2018-04-19 VITALS — SYSTOLIC BLOOD PRESSURE: 154 MMHG | DIASTOLIC BLOOD PRESSURE: 54 MMHG

## 2018-04-19 VITALS — DIASTOLIC BLOOD PRESSURE: 73 MMHG | SYSTOLIC BLOOD PRESSURE: 158 MMHG

## 2018-04-19 VITALS — DIASTOLIC BLOOD PRESSURE: 73 MMHG | SYSTOLIC BLOOD PRESSURE: 154 MMHG

## 2018-04-19 DIAGNOSIS — E21.3: ICD-10-CM

## 2018-04-19 DIAGNOSIS — F41.8: ICD-10-CM

## 2018-04-19 DIAGNOSIS — Z85.3: ICD-10-CM

## 2018-04-19 DIAGNOSIS — Z99.81: ICD-10-CM

## 2018-04-19 DIAGNOSIS — F17.210: ICD-10-CM

## 2018-04-19 DIAGNOSIS — J44.1: Primary | ICD-10-CM

## 2018-04-19 DIAGNOSIS — J44.0: ICD-10-CM

## 2018-04-19 DIAGNOSIS — Z92.21: ICD-10-CM

## 2018-04-19 DIAGNOSIS — I50.30: ICD-10-CM

## 2018-04-19 DIAGNOSIS — N18.3: ICD-10-CM

## 2018-04-19 DIAGNOSIS — Z88.8: ICD-10-CM

## 2018-04-19 DIAGNOSIS — E87.6: ICD-10-CM

## 2018-04-19 DIAGNOSIS — E03.9: ICD-10-CM

## 2018-04-19 DIAGNOSIS — I13.0: ICD-10-CM

## 2018-04-19 DIAGNOSIS — I27.20: ICD-10-CM

## 2018-04-19 DIAGNOSIS — J18.1: ICD-10-CM

## 2018-04-19 DIAGNOSIS — Z90.710: ICD-10-CM

## 2018-04-19 DIAGNOSIS — F43.21: ICD-10-CM

## 2018-04-19 LAB — PLATELET COUNT, AUTOMATED: 155 K/UL (ref 150–450)

## 2018-04-19 PROCEDURE — 85025 COMPLETE CBC W/AUTO DIFF WBC: CPT

## 2018-04-19 PROCEDURE — 84155 ASSAY OF PROTEIN SERUM: CPT

## 2018-04-19 PROCEDURE — 82435 ASSAY OF BLOOD CHLORIDE: CPT

## 2018-04-19 PROCEDURE — 94640 AIRWAY INHALATION TREATMENT: CPT

## 2018-04-19 PROCEDURE — 82565 ASSAY OF CREATININE: CPT

## 2018-04-19 PROCEDURE — 82947 ASSAY GLUCOSE BLOOD QUANT: CPT

## 2018-04-19 PROCEDURE — 84520 ASSAY OF UREA NITROGEN: CPT

## 2018-04-19 PROCEDURE — 84295 ASSAY OF SERUM SODIUM: CPT

## 2018-04-19 PROCEDURE — 97161 PT EVAL LOW COMPLEX 20 MIN: CPT

## 2018-04-19 PROCEDURE — 36600 WITHDRAWAL OF ARTERIAL BLOOD: CPT

## 2018-04-19 PROCEDURE — 84450 TRANSFERASE (AST) (SGOT): CPT

## 2018-04-19 PROCEDURE — 82803 BLOOD GASES ANY COMBINATION: CPT

## 2018-04-19 PROCEDURE — 84460 ALANINE AMINO (ALT) (SGPT): CPT

## 2018-04-19 PROCEDURE — 36415 COLL VENOUS BLD VENIPUNCTURE: CPT

## 2018-04-19 PROCEDURE — 82374 ASSAY BLOOD CARBON DIOXIDE: CPT

## 2018-04-19 PROCEDURE — 84075 ASSAY ALKALINE PHOSPHATASE: CPT

## 2018-04-19 PROCEDURE — 94668 MNPJ CHEST WALL SBSQ: CPT

## 2018-04-19 PROCEDURE — 82247 BILIRUBIN TOTAL: CPT

## 2018-04-19 PROCEDURE — 82040 ASSAY OF SERUM ALBUMIN: CPT

## 2018-04-19 PROCEDURE — 97166 OT EVAL MOD COMPLEX 45 MIN: CPT

## 2018-04-19 PROCEDURE — 82310 ASSAY OF CALCIUM: CPT

## 2018-04-19 PROCEDURE — 84132 ASSAY OF SERUM POTASSIUM: CPT

## 2018-04-19 PROCEDURE — 94660 CPAP INITIATION&MGMT: CPT

## 2018-04-19 PROCEDURE — 94667 MNPJ CHEST WALL 1ST: CPT

## 2018-04-19 RX ADMIN — LEVOTHYROXINE SODIUM SCH MG: 100 TABLET ORAL at 06:18

## 2018-04-19 RX ADMIN — FLUTICASONE PROPIONATE AND SALMETEROL SCH EACH: 50; 250 POWDER RESPIRATORY (INHALATION) at 17:24

## 2018-04-19 RX ADMIN — POTASSIUM CHLORIDE SCH MEQ: 750 TABLET, FILM COATED, EXTENDED RELEASE ORAL at 17:05

## 2018-04-19 RX ADMIN — ACETAMINOPHEN PRN MG: 325 TABLET ORAL at 20:40

## 2018-04-19 RX ADMIN — POTASSIUM CHLORIDE SCH MEQ: 750 TABLET, FILM COATED, EXTENDED RELEASE ORAL at 08:57

## 2018-04-19 RX ADMIN — FUROSEMIDE SCH MG: 20 TABLET ORAL at 08:58

## 2018-04-19 RX ADMIN — DILTIAZEM HYDROCHLORIDE SCH MG: 120 CAPSULE, EXTENDED RELEASE ORAL at 20:39

## 2018-04-19 RX ADMIN — FLUTICASONE PROPIONATE AND SALMETEROL SCH EACH: 50; 250 POWDER RESPIRATORY (INHALATION) at 05:56

## 2018-04-19 RX ADMIN — LEVALBUTEROL HYDROCHLORIDE SCH MG: 1.25 SOLUTION RESPIRATORY (INHALATION) at 14:32

## 2018-04-19 RX ADMIN — CEFDINIR SCH MG: 300 CAPSULE ORAL at 20:39

## 2018-04-19 RX ADMIN — LEVALBUTEROL HYDROCHLORIDE SCH MG: 1.25 SOLUTION RESPIRATORY (INHALATION) at 09:05

## 2018-04-19 RX ADMIN — SODIUM CHLORIDE SCH MG: 900 INJECTION, SOLUTION INTRAVENOUS at 04:39

## 2018-04-19 RX ADMIN — CEFDINIR SCH MG: 300 CAPSULE ORAL at 08:57

## 2018-04-19 RX ADMIN — SPIRONOLACTONE SCH MG: 25 TABLET ORAL at 08:58

## 2018-04-19 RX ADMIN — LEVALBUTEROL HYDROCHLORIDE SCH MG: 1.25 SOLUTION RESPIRATORY (INHALATION) at 17:14

## 2018-04-19 RX ADMIN — LEVALBUTEROL HYDROCHLORIDE SCH MG: 1.25 SOLUTION RESPIRATORY (INHALATION) at 05:56

## 2018-04-19 RX ADMIN — DILTIAZEM HYDROCHLORIDE SCH MG: 120 CAPSULE, EXTENDED RELEASE ORAL at 08:58

## 2018-04-19 RX ADMIN — PANTOPRAZOLE SODIUM SCH MG: 40 TABLET, DELAYED RELEASE ORAL at 08:58

## 2018-04-19 NOTE — ECF H&P BLANK
[facility] H&P UPDATE


History of Present Illness


Chief Complaint


The patient is a 77 year old female with PMH significant for COPD with severe 

obstructive defect by PFTs in 2016 who presents with increased shortness of 

breath for the past 24-48hours.


History of Present Illness


The patient is a current smoker. She is on multiple medications for her COPD. 

She did participate in pulmonary rehabilitation in 2016. Pre rehab PFTs showed 

an FEV1 of .66. She had been on oxygen 2L at  only but about 4 months ago had 

to increase it to 24 hours daily. 





Her shortness of breath has worsened over the past 24-48 hours. The patient 

denies fever, chills or cough.  She had a URI at the end of  and was 

treated with Levaquin and steroids. Those symptoms resolved but she remained on 

oxygen . She notes that with the shortness of breath her chest has felt 

tight and she has had difficulty getting air in and out. She has been wheezing 

more. She does have risk factors for CAD including +FH, smoking, HTN and 

hyperlipidemia. She did have some LE edema but this resolved when she recently 

stopped taking amlodipine. Her BP has been high off of amlodipine and 

diuretics. Diuretics were stopped due to hypercalcemia. She has a history of 

hyperparathyroidism and is s/p parathyroidectomy. Her PCP is monitoring her 

calcium level which has improved off of calcium supplements and diuretics. The 

patient states she has not smoked for 2 days due to shortness of breath. She 

would like to quit smoking.





She denies orthopnea or PND. She has not had palpitations, diaphoresis or 

nausea.





History


Problems:  


(1) Breast cancer


Status:  Resolved


(2) Hyperlipidemia


Status:  Chronic


(3) CKD (chronic kidney disease), stage III


Status:  Chronic


(4) COPD (chronic obstructive pulmonary disease)


Status:  Chronic


(5) H/O hyperparathyroidism


Status:  Chronic


(6) Hypothyroidism


Status:  Chronic


(7) Depression


Status:  Chronic


(8) Anxiety


Status:  Chronic


(9) Upper GI bleed


Status:  Chronic


(10) Ulcer


Status:  Resolved


(11) Hypertension


Status:  Chronic


Home Meds


Active Scripts


Benazepril Hcl (BENAZEPRIL HCL) 40 Mg Tablet, 40 MG PO QDAY, #5 TAB 0 Refills


   Prov:SAEED DAVIS MD         18


Fluoxetine Hcl (PROZAC) 20 Mg Capsule, 20 MG PO QDAY, #90 CAPSULE 1 Refill


   Prov:SAEED DAVIS MD         3/13/18


Alprazolam (ALPRAZOLAM) 0.5 Mg Tab.rapdis, 0.5 MG PO QAM for Anxiety MDD .5 mg 

for 60 Days, #60 TAB


   Prov:SAEED DAVIS MD         18


Umeclidinium Bromide (Incruse Ellipta) 62.5 Mcg Blst.w.dev, 1 INH INH DAILY, #1 

INH 11 Refills


   Prov:SAEED DAVIS MD         18


Albuterol Sulfate 90 Mcg/Act (PROAIR HFA 90 MCG/ACT) 8.5 Gm Hfa.aer.ad, 2 PUFF 

IH Q4-6H, #1 INHALER 11 Refills


   Prov:SAEED DAVIS MD         18


Fluticasone/Salmeterol (ADVAIR 250-50 DISKUS) 1 Each Disk.w.dev, 1 PUFF IH BID, 

#3 INH 3 Refills


   Prov:SAEED DAVIS MD         18


Reported Medications


Vit A/Vit C/Vit E/Zinc/Copper (PRESERVISION AREDS SOFTGEL) 1 Each Capsule, 1 

EACH PO DAILY, CAPSULE


   18


Oxygen (OXYGEN)  Inha, 2 L INH, L


   17


Levothyroxine Sodium (LEVOTHYROXINE SODIUM) 100 Mcg Tablet, 100 MCG PO QDAY, TAB


   17


Discontinued Reported Medications


[areds]   No Conflict Check


   17


Multivitamin (DAILY MULTIPLE VITAMIN) 1 Each Tablet, 1 TAB PO DAILY


   17


Allergies:  


Coded Allergies:  


     NSAIDS (Non-Steroidal Anti-Inflamma (Verified  Adverse Reaction, Unknown, 

Pt does not take NSAIDS, 18)


 PT HAS HISTORY OF GASTRIC ULCERS.


Patient History:  


FH: arthritis


  MOTHER, , Age:89


  BROTHER OR SISTER


  BROTHER OR SISTER


FH: breast cancer


  BROTHER OR SISTER


FH: cancer


FH: heart attack


  FATHER, , Age:73


  MOTHER, , Age:89


FH: heart disease


  BROTHER OR SISTER


Other Social/Family Hx


The patient is  and lives with her  in [place]. She is a retired 

nurse. She has children but none of them live in [place]. Her  has 

dementia and she cares for him.


Hx Smoking:  Yes


Smoking Status:  Current: Every Day Smoker, Current: Some Days Smoker


Exposure to Second Hand Smoke?:  No


Caffeine Intake:  Coffee


Caffeine/Cups Per Day:  2-3


Hx Alcohol Use:  Yes


Hx Substance Use Disorder:  No


Social Drug Use:  Never


History of IV Drug Use:  No





Review of Systems


All Systems Reviewed/Normal:  Yes, Except as Noted


Constitutional:  No Fever


Cardiovascular:  No Chest Pain, No Palpitations


Respiratory:  Shortness of Breath, Wheezing, Other (Feels tight in chest with 

difficulty getting air in and out.), No Cough


Gastrointestinal:  No Nausea


Psychiatric:  Depression, Anxiety





Exam


Vital Signs





Vital Signs








  Date Time  Temp Pulse Resp B/P (MAP) Pulse Ox O2 Delivery O2 Flow Rate FiO2


 


18 21:11  81  194/92 (126) 91 Nasal Cannula 2.0 


 


18 20:45 98.5  24     








General Appearance:  Alert, Awake, No Acute Distress, Afebrile


Neuro:  No Gross deficits


Eyes:  PERRLA


Cardiovascular:  Regular Rate and Rhythm


Respiratory:  Other (Markedly decreased BS in lower lung fields with expiratory 

wheezes noted. )


GI:  Abd Soft and Non-Tender (BS+)


Lymph:  Cervical Nodes Benign


Extremities:  Warm, Perfused, Other (No significant edema.)


Psych:  Alert & Oriented X3, Appropriate Mood & Affect





Medical Decision Making


Data Points











Item Value  Date Time


 


D-Dimer Quantitative (PE/DVT) 1.05 ug/ml H 18 1100


 


Sodium Level 140 mmol/L 18 1100


 


Potassium Level 3.6 mmol/L 18 1100


 


Chloride Level 99 mmol/L 18 1100


 


Carbon Dioxide Level 31 mmol/L 18 1100


 


Blood Urea Nitrogen 21 mg/dl H 18 1100


 


Creatinine 1.10 mg/dl H 18 1100


 


Glomerular Filtration Rate Calc 48.2 18 1100


 


Random Glucose 96 mg/dl 18 1100


 


Calcium Level 10.3 mg/dl H 18 1100


 


Total Bilirubin 0.3 mg/dl 18 1100


 


Aspartate Amino Transf (AST/SGOT) 24 U/L 18 1100


 


Alanine Aminotransferase (ALT/SGPT) 23 U/L 18 1100


 


Alkaline Phosphatase 81 U/L 18 1100


 


Total Protein 6.4 gm/dl 18 1100


 


Albumin 3.7 g/dl 18 1100


 


Thyroid Stimulating Hormone (TSH) 0.55 uIU/ml 18 1100


 


Troponin I 0.044 ng/ml 18 1100


 


Troponin I 0.046 ng/ml 18 1512


 


Troponin I 0.048 ng/ml 18 1714


 


B-Type Natriuretic Peptide 650 pg/ml H 18 1714


 


White Blood Count 6.9 k/uL 18 1100


 


Red Blood Count 4.19 M/uL 18 1100


 


Hemoglobin 13.1 g/dL 18 1100


 


Hematocrit 38.7 % 18 1100


 


Mean Corpuscular Volume 92.2 fL 18 1100


 


Mean Corpuscular Hemoglobin 31.3 pg 18 1100


 


Mean Corpuscular Hemoglobin Concent 34.0 g/dL 18 1100


 


Red Cell Distribution Width 14.0 % 18 1100


 


Platelet Count 227 K/uL 18 1100


 


Mean Platelet Volume 9.5 fL 18 1100


 


Neutrophils (%) (Auto) 72.6 % H 18 1100


 


Lymphocytes (%) (Auto) 14.0 % L 18 1100


 


Monocytes (%) (Auto) 11.1 % 18 1100


 


Eosinophils (%) (Auto) 0.9 % 18 1100


 


Basophils (%) (Auto) 1.4 % 18 1100


 


Nucleated RBC Relative Count (auto) 0.0 /100WBC 18 1100


 


Neutrophils # (Auto) 5.0 K/uL 18 1100


 


Lymphocytes # (Auto) 1.0 K/uL L 18 1100


 


Monocytes # (Auto) 0.8 K/uL 18 1100


 


Eosinophils # (Auto) 0.1 K/uL 18 1100


 


Basophils # (Auto) 0.1 K/uL 18 1100


 


Nucleated RBC Absolute Count (auto) 0.00 K/uL 18 1100











EKG / Imaging


EKG Interpretation


[place] [facility] [place] NAME: CARLINE ZAPATA


: [id number]


MR: [id number]


V: [id number]


EXAM DATE: 


ORDERING PHYSICIAN: ELIAS MYRICK


TECHNOLOGIST: KARINE


 


Test Reason : SOB


Blood Pressure : ***/*** mmHG


Vent. Rate : 083 BPM     Atrial Rate : 083 BPM


   P-R Int : 144 ms          QRS Dur : 090 ms


    QT Int : 388 ms       P-R-T Axes : 059 -72 068 degrees


   QTc Int : 455 ms


 


Normal sinus rhythm


Possible Left atrial enlargement


Left axis deviation


Septal infarct , age undetermined


Abnormal ECG


When compared with ECG of 2018 10:01,


premature supraventricular complexes are no longer present


 


Referred By:  RAMEZ           Confirmed By: 

















D:18 1745


T: 


/


Imaging


[place] [facility] [place] 


 


PATIENT NAME: Carline Zapata


: 1940


MR: [id number]


V: [id number]


EXAM DATE: 


ORDERING PHYSICIAN: SAEED DAVIS


TECHNOLOGIST: 


 


Location: [facility]


Patient: Carline Zapata


: 1940


MRN: [id number]


Visit/Account:[id number]


Date of Sevice:  2018


 


ACCESSION #: [id number]


 


Exam type: CHEST PA AND LAT


 


History: Dyspnea


 


Comparison: 2017.


 


Findings:


 


Cardiac silhouette is borderline enlarged but unchanged.  There is no evidence 

of acute appearing infiltrates, pleural effusions or overt pulmonary edema.  No 

evidence of a pneumothorax or pneumomediastinum.  There is S-shaped scoliosis 

of the thoracolumbar spine.


 


IMPRESSION:


 


1.  No acute cardiopulmonary process is seen


 


Report Dictated By: Aleyda Blackburn MD at 2018 11:50 AM


 


Report E-Signed By: Aleyda Blackburn MD  at 2018 11:51 AM


 


WSN:[id number]





[place] [facility] [place] NAME: Carline Zapata


: 1940


MR: [id number]


V: [id number]


EXAM DATE: [id number]


ORDERING PHYSICIAN: SAEED DAVIS


TECHNOLOGIST: 


 


Location: [facility]


Patient: Carline Zapata


: 1940


MRN: [id number]


Visit/Account:[id number]


Date of Sevice:  2018


 


ACCESSION #: [id number]


 


EXAMINATION:   CTA of the chest with IV contrast


 


HISTORY:  Elevated d-dimer. Shortness of breath.


 


TECHNIQUE:   Pulmonary embolus protocol - Thin axial CT images of the chest 

were obtained with IV contrast during maximal pulmonary arterial opacification. 

Reconstruction of the source data includes multiplanar 2D coronal and sagittal 

reconstructed images, and 3D coronal and sagittal MIP images. Representative 

images have been stored on PACS.


One of the following dose optimization techniques was utilized in the 

performance of this exam: Automated exposure control; adjustment of the mA and/

or kV according to the patient's size; or use of an iterative  reconstruction 

technique.  Specific details can be referenced in the facility's radiology CT 

exam operational policy.


Contrast:   75 mL of IV Isovue-370.


 


COMPARISON:   None.


 


FINDINGS:


Pulmonary arteries:  The pulmonary arteries are well opacified, without 

suspicious filling defect.


Heart, aorta, and great vessels:  Normal caliber thoracic aorta. Vascular 

calcifications, including coronary artery calcifications. Normal heart size. No 

pericardial effusion.


 


Lungs and pleura:  Slight scarring or linear atelectasis in the right middle 

lobe and lingula. Calcified granuloma in the right upper lobe posteriorly. The 

lungs are otherwise clear. No abnormal pulmonary nodule, mass, or 

consolidation. The central airways are patent. No pleural effusion or 

pneumothorax.


Mediastinum and munir:  Negative.


 


Visualized upper abdomen:  Unremarkable.


Chest wall:  Bilateral breast implants with capsular calcification. There is 

lobulation along the medial medial margin of both implants which may represent 

chronic extracapsular rupture.


Bones:  Negative.


 


IMPRESSION:


1. No evidence of pulmonary embolism.


2. No other acute findings in the chest.


3. Slight scarring or linear atelectasis in the right middle lobe and lingula. 

The lungs are otherwise clear.


 


Report Dictated By: Walt Man MD at 2018 2:45 PM


 


Report E-Signed By: Walt Man MD  at 2018 2:55 PM


 


WSN:[id number]





Pre-Admit Course


Medical Record Review:  Yes





Assessment and Plan


Problems:  


(1) COPD with exacerbation


Status:  Acute


Assessment & Plan:  The patient was audibly wheezing during my interview. Will 

place on IV steroids. Will order Duonebs tid and albuterol nebs prn. Will 

continue Advair. She will have someone bring her Incruse Ellipta tomorrow.





(2) Elevated brain natriuretic peptide (BNP) level


Status:  Acute


Assessment & Plan:  I suspect she has some pulmonary HTN and right heart 

failure with her severe COPD. Will order an echo for am.





(3) Hypertension


Status:  Chronic


Assessment & Plan:  She had been on diuretics, amlodipine and benazepril. Her 

diuretics were stopped due to hypercalcemia. Her amlodipine was stopped due to 

LE edema. Her BP is quite high here. Will start Cardizem. 





(4) Anxiety


Status:  Chronic


Assessment & Plan:  Continue fluoxetine and prn alprazolam.





(5) Depression


Status:  Chronic


Assessment & Plan:  Continue fluoxetine.





(6) Hypothyroidism


Status:  Chronic


Assessment & Plan:  TSH is 0.55. Continue levothyroxine 100mcg daily.





(7) CKD (chronic kidney disease), stage III


Status:  Chronic


Assessment & Plan:  Creatinine is elevated at 1.1. Monitor.





(8) Hyperlipidemia


Status:  Chronic


Assessment & Plan:  She is not currently on treatment. Dr. Davis is following.





Time Spent on Plan of Care:  < 30 min


Copies to:   SAEED DAVIS MD





Venous Thromboembolism


VTE Risk


Physician Assess for VTE Risk:  Yes


Patient's VTE Risk:  Low





VTE Diagnostic Test


2 Days Prior to Admit:  Yes





Antithrombotics


Is Pt On Any Antithrombotics?:  Yes





Exam


Sepsis Risk:  No Definite Risk











GABBY RODRIGUEZ MD 2018 22:09








<Electronically signed by GABBY RODRIGUEZ MD>


D/T:18





D:18


T:18


JOSE/DANTE





CC: SAEED DAVIS MD    


Patient will be admitted to [facility] for ongoing rehabilitative therapy.











ANDRE RODRIGUEZ MD 2018 14:00

## 2018-04-19 NOTE — CONSULTANT PHARMACY REVIEW
Pharmacy Consultant Review


Medication Review


Do All Mecications have a Diag:  No (XANAX IS ORDERED QHS - NO DIAGNOSIS FOR 

INSOMNIA/ANXIETY)





Beers Criteria Medication 2015


Benzodiazapines (short/interm):  Alprazolam


Proton Pump Inhibitors:  Pantoprazole (HISTORY OF GI ISSUES)





Other General Cautions


PATIENT IS ADMITTED FOR WEAKNESS AND HAS XANAX ORDERED FOR BEDTIME. CAUTION FOR 

FALL RISK WITH THIS MEDICATION AND WEAKNESS





ADVAIR, FLUOXETINE, ALBUTEROL CAN INCREASE RISK FOR QT PROLONGATION. MONITOR 

FOR SIGNS OF ARRHYTHMIA. 





MONITOR POTASSIUM LEVELS WITH SPIRONOLACTONE AND POTASSIUM SUPPLEMENTS. (ALSO 

ON LASIX - POTASSIUM WASTING MED)





Pneumococcal Vaccine


HX Pneumo Vac  ([id number]):  Yes (70)





MD Notified?


MD Notified?:  No











LINDSEY GRACE Apr 19, 2018 14:34

## 2018-04-19 NOTE — OT ECF NOTE
Type of Note: Initial Note

Primary Medical Diagnosis: Generalized weakness s/p extensive hospital 
admission for COPD exacerbation, Pneumonia, Acute resp. failure 

Occupational Therapy Evaluation Date: 18



SUBJECTIVE: 

   Prior Hospitalization: [facility] 18 thru 

   Prior Level of Function: Independent with all ADLs/IADLs. Pt is the primary 
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 

   Prior Living Status: Single level house, Living with family

  

   Community Services: Support adequate, No known needs



   Home Accessibility: 

All needs on one level

Walk-in shower

Tub/shower combination



   Equipment Owned: None

   Medical Complications/Past Medical History: COPD, CKD, Hyperlipidemia, 
Depression, Anxiety, HT, Hx breast cancer. Please refer to EMR for extensive 
details



   Psychosocial Support: Supportive spouse and supportive sons who do not live 
in [place]

   Pain Scale (0-10): None reported at time of evaluation 

   

OBJECTIVE:

   Strength: 

      MMT:         Right   Left

      Shoulder Flexion      WFL                 WFL 

      Elbow Flexion         WFL                 WFL 

               Wrist Extension         WFL                 WFL 

               WFL                 WFL 

         (5= normal, 4= good, 3= fair, 2= poor, 1= trace)



   ROM: Both upper extremities, WFL

   

   Sensation: Intact, No concerns 

   Functional Transfer: 

      Assistive Device: 

      Transfer Ability: Pt declining to get OOB at time of evaluation. Fatigued 
from transfer to [facility].

   ADL:

      Upper body dressing:  

      Assistive device:    

      Upper body dressing ability: 



      Lower body dressing:

      Assistive device: 

      Lower body dressing ability: 



      Toileting:

      Assistive device:  

      Toileting ability:  



      Grooming/hygiene: 

      Assistive device: 

      Grooming ability: 



      Bathing:

      Assistive device: 

      Bathing ability: 



Standardized Assessment:

   Barthel Index of Activities of Daily Livin/20 upon initial evaluation ().



ASSESSMENT:  "Zaina" presents to [facility] with decreased activity tolerance for 
engagement in ADLs/IADLs. She requires 1-2 person assist for ADLs and 
functional mobility. At [facility], she was (I) with all ADLs/IADLs. She will benefit 
from skilled OT services to improve activity tolerance for engagement in ADLs 
and receive appropriate AE education prior to discharge home. 



   Problem List/Current Limitations:   

Decreased activity tolerance 

Decreased strength

Decreased ROM

Generalized weakness

Shortness of breath



   Short Term Goals: 

1) Pt will be SBA grooming/hygiene. 

2) Pt will be SBA toilet task. 

3) Pt will be SBA UB/LB dressing. 

4) Pt will be SBA shower task. 

5) Pt Barthel Index of ADLs score will increase by 2 points. 

6) Pt will be educated on energy conservation and appropriate AE needs.



   Long Term Goals: Return home with  services 

   Patient Goals: Return home

   Rehabilitation Prognosis: Good

   Barriers to Discharge: Medical history, current everyday smoker 



      

PLAN: The patient will benefit from skilled occupational therapy services 5 
times per week for 2 weeks including: 

Ther ex

ADL training

Safety training

Ther act

IADL training

Transfer training

Adaptive equip training

Bed mobility

Energy conservation

   



Thank you for this referral.  If you have any questions, concerns, or comments 
about this report or plan, please contact me at [phone].



Janeth Rivera MS, OTR/L

Occupational Therapist
[facility]

## 2018-04-19 NOTE — HOSPITALIST DEPART
Discharge Summary


Reason for Hosp/Final Diag:  


(1) COPD with exacerbation


Status:  Acute


Hospital Course & Plan:  She does have a history of severe COPD and presented 

with increased shortness of breath. She was placed on treatment with nebulizers 

and IV Solu-Medrol. She was converted to oral prednisone on 2 occasions, but 

her wheezing worsened and she had to be placed back on IV steroids.  She is 

getting nebulizer treatments 4 times daily with as needed as well. CXR 4/15 

continued to show bibasilar infiltrates. Her O2 requirements have decreased and 

she is currently on 2L per NC. She does wear BiPAP at . We started mobilizing

, but she had some difficulty tolerating. She has been working with PT/OT. They 

recommended ongoing rehabilitative therapy. [facility]





(2) Right lower lobe pneumonia


Status:  Acute


Hospital Course & Plan:  Her chest x-ray revealed an infiltrate on the right.  

She has been on treatment with ceftriaxone and azithromycin.  We converted her 

to oral cefdinir and azithromycin on . She will complete therapy on 18.





(3) Heart failure with preserved left ventricular function (HFpEF)


Status:  Resolved


Hospital Course & Plan:  She did have an elevated BNP.  Her echocardiogram 

showed a preserved ejection fraction, but pulmonary hypertension.   Her weights 

remain elevated above her baseline.  We started her on scheduled oral Lasix 

initially and added spironolactone. She will need lab monitored periodically 

while on ECF.  





(4) Upper GI bleed


Status:  Acute


Hospital Course & Plan:  The patient has a history of PUD.  She had about 300cc 

of dark blood from the NG on 18.  Hgb did drop from 13.3 to 11.  Her 

Lovenox was stopped and she was given 2 units of FFP.  She was on Protonix and 

Carafate (we stopped Carafate).  Hgb has been stable.  On SCD for VTE 

prophylaxis.





(5) Hypokalemia


Status:  Acute


Hospital Course & Plan:  She is on oral replacement and added spironolactone.  

Will follow labs.





(6) Supraventricular tachycardia


Status:  Acute


Hospital Course & Plan:  She was noted to be in a narrow complex tachycardia 

during her code event.  She converted to sinus rhythm after receiving 

adenosine.  She continued to have short runs of SVT in the ICU.  She was placed 

on a diltiazem drip initially, but was converted to oral therapy on .  Dr. Abreu spoke with Cardiology (Dr. Thornton) who agreed with the plan and 

recommended Cardiology follow up as an outpatient.  He also recommended ASA, 

but because of her GI bleed, didn't think it was needed at this time (or Plavix)

. She continues to have some ectopy, but overall HR is improved. She is 

currently on max dose diltiazem ER 240mg BID.





(7) Cardiorespiratory arrest


Status:  Acute


Hospital Course & Plan:  She did suffer a pulseless arrest on 18.  This was 

likely secondary to SVT with rates over 200 by telemetry report.  Echo shows an 

EF of 55%, and no wall motion abnormalities.  The troponins were in the 

borderline area and relatively unchanged from admission.





(8) Acute respiratory failure


Status:  Resolved


Hospital Course & Plan:  She was intubated on 18 during a code.  She 

extubated herself on .  She currently is wearing BiPAP intermittently.





(9) Elevated troponin


Hospital Course & Plan:  She has had an elevated troponin, but these have not 

trended in a pattern consistent with infarction.  





(10) Hypertension


Status:  Chronic


Hospital Course & Plan:  She had been on diuretics, amlodipine and benazepril, 

which had been on hold.  She has been started on diltiazem as above.  We had 

restarted her benazepril, but then stopped again because of increasing 

creatinine and need for aggressive diuresis.





(11) Anxiety


Status:  Chronic


Hospital Course & Plan:  She has been on chronic fluoxetine and alprazolam.





(12) Depression


Status:  Chronic


Hospital Course & Plan:  She has been on the fluoxetine.





(13) Hypothyroidism


Status:  Chronic


Hospital Course & Plan:  She is on chronic treatment with Synthroid. 





(14) CKD (chronic kidney disease), stage III


Status:  Chronic


Hospital Course & Plan:  Her current creatinine is 1.2. Her BUN is elevated 

likely due to diuresis. Will continue to monitor closely.





Departure


Weight (Pounds):  183


Weight (Ounces):  3.0


Result Diagram:  


18














Item Value  Date Time


 


White Blood Count 9.1 k/uL 18


 


Hemoglobin 13.3 g/dL 18


 


Hematocrit 40.4 % 4/7/18 0416


 


Platelet Count 268 K/uL 18 0416


 


Albumin 3.9 g/dl 18 0416


 


Total Protein 6.7 gm/dl 18 0416


 


Troponin I 0.047 ng/ml 18 0416


 


Alkaline Phosphatase 93 U/L 18 0416


 


Alanine Aminotransferase (ALT/SGPT) 45 U/L 18 0416


 


Aspartate Amino Transf (AST/SGOT) 45 U/L H 18 0416


 


Total Bilirubin 0.2 mg/dl 18 0416


 


Magnesium Level 2.3 mg/dl H 18 0416


 


Calcium Level 10.3 mg/dl H 18 0416


 


Random Glucose 214 mg/dl H 18 0416


 


Glomerular Filtration Rate Calc 43.6 18 0416


 


Creatinine 1.20 mg/dl H 18 0416


 


Blood Urea Nitrogen 28 mg/dl H 18 0416


 


Carbon Dioxide Level 26 mmol/L 18 0416


 


Chloride Level 98 mmol/L 18 0416


 


Sodium Level 140 mmol/L 18 0416


 


Potassium Level 3.8 mmol/L 18 0416


 


Troponin I 0.070 ng/ml 18 1105


 


Troponin I 0.068 ng/ml 18 1712


 


Troponin I 0.044 ng/ml 18 0510


 


B-Type Natriuretic Peptide 447 pg/ml H 18 0510


 


B-Type Natriuretic Peptide 989 pg/ml H 18 0416


 


B-Type Natriuretic Peptide 1040 pg/ml H 18 0543


 


B-Type Natriuretic Peptide 1120 pg/ml H 18 0508


 


D-Dimer Quantitative (PE/DVT) 1.13 ug/ml H 18 0416








Imaging


PATIENT NAME: CARLINE JACKSON


: [id number]


MR: [id number]


V: [id number]


EXAM DATE: 


ORDERING PHYSICIAN: GABBY RODRIGUEZ


TECHNOLOGIST: Samantha Yeung


 


EXAMINATION:   T   WO-DIMENSIONAL ECHOCARDIOGRAPH


REASON:   ELEVATED BNP/POSSIBLE PULMONARY HTN


 


2D Measurements (normal values in centimeters)


   LV end   LV end   RV end   Vent.   LV Post   Aortic   Left   Percent


   Diastolic   Systolic   Diastolic   Septum   Wall   Root   Atrium   Shortening


(3.5-5.7)   (0.9-2.6)   (0.6-1.1)   (0.6-1.1)   (2.0-3.7)   (1.9-4.0)   (25-35%)


   4.71   3.13   3.1   1.0   1.3   2.3   3.8   33.5%


 


STROKE VOLUME:  60.7ml


ESTIMATED EJECTION FRACTION:   55%


 


PARASTERNAL LONG AXIS:   


Overall left ventricular systolic function does appear to be normal. 


Right ventricle appears to be the upper range of normal in size if not 


slightly enlarged. The aortic valve & mitral valve both appear to 


open normally. The patient appears to be in sinus rhythm. No wall 


motion abnormalities are noted. Right ventricular function appears to 


be normal. The TAPSE measured at 2.5. Mild mitral annular calcification 


is noted. Color examination of the valves reveals some mitral 


insufficiency as well as a trace of aortic insufficiency present. There 


is also some tricuspid insufficiency.


 


PARASTERNAL SHORT AXIS: 


Overall left ventricular systolic function again appears to be normal. 


Right ventricle appears to be borderline enlarged if not slightly 


enlarged. The aortic valve is not well seen but it is probably 


trileaflet in configuration with some aortic insufficiency noted.


 


APICAL FOUR AND TWO CHAMBER: 


Normal left ventricular ejection fraction. Aortic valve area & mitral 


valve area both measure within normal ranges at 2.5 & 3.4cm2 


respectively. The tricuspid regurgitation Vmax measured 3.54m/sec. 


Estimated right atrial pressures of 3mm Hg giving a total right atrial 


pressure of 53mm Hg. Left atrial volume is the upper range of normal in 


size. Right atrial volume is also the upper range of normal in size. 


Mild left ventricular thickening is noted. No evidence for any outflow 


tract obstruction. Mild amount of tricuspid insufficiency is noted. 


There is a moderate amount of mitral insufficiency noted. View is 


somewhat directed toward the free wall of the left atrium. There is 


mitral annular calcification. Mitral valve area measured within normal 


ranges at 3.4cm2 respectively. Aortic insufficiency is also noted.


 


SUBCOSTAL VIEW: 


No pericardial effusion was noted. No atrioseptal or ventriculoseptal 


defects were appreciated. Doppler examination of the mitral valve in 


diastole does reveal the A wave > E wave indicates that there is 


reversal with Valsalva maneuver suggesting decreased diastolic 


function. Aortic insufficiency pressure half time was measured 464msec.


 


 


 


 


OVERALL IMPRESSION: 


1. Normal left ventricular ejection fraction of 55% with a moderate 


decrease in diastolic function.


2. Borderline to mildly enlarged right ventricle with the other chamber 


sizes being normal. The left atrial & right atrial volumes are 


measured the upper range of normal.


3. A trileaflet aortic valve with no aortic stenosis but a moderate 


amount of aortic insufficiency.


4. Mild mitral annular calcification but no stenosis & a moderate 


amount of mitral insufficiency.


5. A mild amount of tricuspid insufficiency with estimated right 


systolic pressures at 53mm Hg which does include an estimated right 


atrial pressure of 3mm Hg indicating moderate pulmonary hypertension 


& increased right ventricular systolic pressures.


6. A trace of pulmonic insufficiency.


7. Mild left ventricular thickening but no evidence for any outflow 


tract obstruction.


 


 


 


 


 


 


 


 


 


 


 


Dictated by: TREMAYNE Chavarria M.D. on 2018 at 10:29   


Transcribed by: JACQUE on 2018 at 11:15    


Approved by: TREMAYNE Chavarria M.D. on 2018 at 14:03   


[facility]


EKG


PATIENT NAME: CARLINE JACKSON


: [id number]


MR: [id number]


V: [id number]


EXAM DATE: 


ORDERING PHYSICIAN: ELIAS MYRICK


TECHNOLOGIST: KARINE


 


Test Reason : SOB


Blood Pressure : ***/*** mmHG


Vent. Rate : 083 BPM     Atrial Rate : 083 BPM


   P-R Int : 144 ms          QRS Dur : 090 ms


    QT Int : 388 ms       P-R-T Axes : 059 -72 068 degrees


   QTc Int : 455 ms


 


Normal sinus rhythm


Possible Left atrial enlargement


Left axis deviation


Septal infarct , age undetermined


Abnormal ECG


When compared with ECG of 2018 10:01,


premature supraventricular complexes are no longer present


Confirmed by GABBY CORRAL ([id number]) on 2018 6:27:13 AM


 


Referred By:  RAMEZ           Confirmed By:GABBY CORRAL








Condition:  Improved


Discharge:  [facility] ECF


PT/OT Follow Up For:  PT For Strengthening, OT For ADL's


Follow-Up Labs:  Other (CBC, CMP in 2-3 days)


Time Spent:  > 30 min





Discharge Instructions


Home Meds


Active Scripts


Prednisone (PREDNISONE) 20 Mg Tablet, 40 MG PO QDAY for 7 Days, TAB


   Prov:ANDRE RODRIGUEZ MD         18


Spironolactone (SPIRONOLACTONE) 25 Mg Tablet, 25 MG PO QDAY for 30 Days, TAB


   Prov:ANDRE RODRIGUEZ MD         18


Potassium Chloride (KLOR-CON 10) 10 Meq Tablet.er, 20 MEQ PO BIDBS for 10 Days, 

TAB


   Prov:ANDRE RODRIGUEZ MD         18


Pantoprazole Sodium (PANTOPRAZOLE SODIUM) 40 Mg Tablet.dr, 40 MG PO QDAY for 30 

Days, TAB


   Prov:ANDRE RODRIGUEZ MD         18


Furosemide (FUROSEMIDE) 20 Mg Tablet, 20 MG PO QDAY for 30 Days, TAB


   Prov:ANDRE RODRIGUEZ MD         18


Diltiazem Hcl (DILTIAZEM 24HR CD) 120 Mg Cap.er.24h, 240 MG PO BID for 30 Days, 

CAP


   Prov:ANDRE RODRIGUEZ MD         18


Cefdinir 300 Mg Cap (OMNICEF 300 MG CAP (OR EQUIV)) 300 Mg Cap, 300 MG PO BID 

for 2 Days, CAP


   Prov:ANDRE RODRIGUEZ MD         18


Alprazolam 0.5 Mg Tab (ALPRAZOLAM 0.5 MG TAB) 0.5 Mg Tablet, 0.5 MG PO HS for 

30 Days, TAB


   Prov:ANDRE RODRIGUEZ MD         18


Acetaminophen (MAPAP) 325 Mg Tablet, 650 MG PO Q6H Y for FEVER/PAIN for 30 Days

, TAB


   Prov:ANDRE RODRIGUEZ MD         18


Fluoxetine Hcl (PROZAC) 20 Mg Capsule, 20 MG PO QDAY, #90 CAPSULE 1 Refill


   Prov:SAEED FOSTER MD         3/13/18


Fluticasone/Salmeterol (ADVAIR 250-50 DISKUS) 1 Each Disk.w.dev, 1 PUFF IH BID, 

#3 INH 3 Refills


   Prov:SAEED FOSTER MD         18


Reported Medications


Vit A/Vit C/Vit E/Zinc/Copper (PRESERVISION AREDS SOFTGEL) 1 Each Capsule, 1 

EACH PO DAILY, CAPSULE


   18


Oxygen (OXYGEN)  Inha, 2 L INH, L


   17


Levothyroxine Sodium (LEVOTHYROXINE SODIUM) 100 Mcg Tablet, 100 MCG PO QDAY, TAB


   17


Discontinued Scripts


Benazepril Hcl (BENAZEPRIL HCL) 40 Mg Tablet, 40 MG PO QDAY, #5 TAB 0 Refills


   Prov:SAEED FOSTER MD         18


Alprazolam (ALPRAZOLAM) 0.5 Mg Tab.rapdis, 0.5 MG PO QAM for Anxiety MDD .5 mg 

for 60 Days, #60 TAB


   Prov:SAEED FOSTER MD         18


Umeclidinium Bromide (Incruse Ellipta) 62.5 Mcg Blst.w.dev, 1 INH INH DAILY, #1 

INH 11 Refills


   Prov:SAEED FOSTER MD         18


Albuterol Sulfate 90 Mcg/Act (PROAIR HFA 90 MCG/ACT) 8.5 Gm Hfa.aer.ad, 2 PUFF 

IH Q4-6H, #1 INHALER 11 Refills


   Prov:SAEED FOSTER MD         18


Diet:  Regular


Activity:  As Tolerated, No Exertion


Special Instructions:  


She will be followed by the Hospitalist Service while on [facility] ECF.


Copies to:   SAEED FOSTER MD





Venous Thromboembolism


Antithrombotics


Is Pt On Any Antithrombotics?:  Yes





Problem Qualifiers





(1) Hypertension:  


Hypertension type:  essential hypertension  Qualified Codes:  I10 - Essential (

primary) hypertension








ANDRE RODRIGUEZ MD 2018 11:48

## 2018-04-20 VITALS — SYSTOLIC BLOOD PRESSURE: 134 MMHG | DIASTOLIC BLOOD PRESSURE: 62 MMHG

## 2018-04-20 VITALS — DIASTOLIC BLOOD PRESSURE: 64 MMHG | SYSTOLIC BLOOD PRESSURE: 147 MMHG

## 2018-04-20 RX ADMIN — FLUTICASONE PROPIONATE AND SALMETEROL SCH EACH: 50; 250 POWDER RESPIRATORY (INHALATION) at 05:53

## 2018-04-20 RX ADMIN — CEFDINIR SCH MG: 300 CAPSULE ORAL at 08:42

## 2018-04-20 RX ADMIN — DILTIAZEM HYDROCHLORIDE SCH MG: 120 CAPSULE, EXTENDED RELEASE ORAL at 08:42

## 2018-04-20 RX ADMIN — DILTIAZEM HYDROCHLORIDE SCH MG: 120 CAPSULE, EXTENDED RELEASE ORAL at 21:03

## 2018-04-20 RX ADMIN — FLUTICASONE PROPIONATE AND SALMETEROL SCH EACH: 50; 250 POWDER RESPIRATORY (INHALATION) at 17:04

## 2018-04-20 RX ADMIN — LEVALBUTEROL HYDROCHLORIDE SCH MG: 1.25 SOLUTION RESPIRATORY (INHALATION) at 13:04

## 2018-04-20 RX ADMIN — FUROSEMIDE SCH MG: 20 TABLET ORAL at 08:42

## 2018-04-20 RX ADMIN — SPIRONOLACTONE SCH MG: 25 TABLET ORAL at 08:42

## 2018-04-20 RX ADMIN — LEVALBUTEROL HYDROCHLORIDE SCH MG: 1.25 SOLUTION RESPIRATORY (INHALATION) at 17:04

## 2018-04-20 RX ADMIN — ACETAMINOPHEN PRN MG: 325 TABLET ORAL at 19:24

## 2018-04-20 RX ADMIN — PANTOPRAZOLE SODIUM SCH MG: 40 TABLET, DELAYED RELEASE ORAL at 08:43

## 2018-04-20 RX ADMIN — LEVALBUTEROL HYDROCHLORIDE SCH MG: 1.25 SOLUTION RESPIRATORY (INHALATION) at 09:39

## 2018-04-20 RX ADMIN — POTASSIUM CHLORIDE SCH MEQ: 750 TABLET, FILM COATED, EXTENDED RELEASE ORAL at 08:43

## 2018-04-20 RX ADMIN — LEVOTHYROXINE SODIUM SCH MG: 100 TABLET ORAL at 06:11

## 2018-04-20 RX ADMIN — CEFDINIR SCH MG: 300 CAPSULE ORAL at 21:03

## 2018-04-20 RX ADMIN — POTASSIUM CHLORIDE SCH MEQ: 750 TABLET, FILM COATED, EXTENDED RELEASE ORAL at 17:16

## 2018-04-20 RX ADMIN — LEVALBUTEROL HYDROCHLORIDE SCH MG: 1.25 SOLUTION RESPIRATORY (INHALATION) at 05:53

## 2018-04-20 NOTE — PT ECF NOTE
Type of Note: Initial Note

Primary Medical Diagnosis: COPD exacerbation, cardiorespiratory arrest, acute 
respiratory failure 

Physical Therapy Evaluation Date: 4/20/18



SUBJECTIVE: 

   Prior Hospitalization: [facility]19/18

   Prior Level of Function: Independent with no AD, pt was primary caregiver of 
her spouse 

   Prior Living Status:  Single level house, Spouse

   Community Services:   Independent

   Home Accessibility:   All needs on one level



   Equipment Owned:  None at this time    

   Medical Complications/Past Medical History: Extensive, see EMR 

   Psychosocial Support: Supportive sons (none of whom live in [place]), 
 (pt reports that he has dementia) 

   Pain Scale (0-10): Pt reports generalized "soreness" 

          

OBJECTIVE:

   Strength: 

      Right Lower Extremity:

         DF: 4/5

         Knee flexion: 3+/5

         Knee extension: 3+/5

         Hip flexion: <3/5



      Left Lower Extremity:

         DF: 4/5

         Knee flexion: 3+/5

         Knee extension: 3+/5

         Hip flexion: <3/5



   ROM: WFL    

   Sensation: WNL

   Other Neuro findings: None noted 



   Bed Mobility:  Not assessed at time of evaluation 

   Transfers: modAx1 with RW      

   Gait: CGA x10' with RW 

   Stairs: NT



   Timed Up and Go (>12 seconds indicated increased risk for falls): Pt unable 
at time of eval 

   Other Objective Measures:  Functional Reach Test: 5 inches (indicating high 
fall risk) 



ASSESSMENT:  PT ECF eval complete. Pt unable to rise to stand initially from 
elevated toilet with maxA provided. PT instructed patient in step by step cues 
for sequencing and pt was able to rise to stand with modA x1. Pt ambulated from 
bathroom to gerichair with CGA and use of RW. SpO2 dropped to 84% on 2L with 
mobility, this returned to WNL with cues for deep breathing. The patient is the 
primary caregiver of her , and verbalizes importance of considering 
various LT discharge plans. The pt requires increased assistance for all 
functional mobility and will benefit from skilled PT in order to increase 
independence and tolerance to functional mobility prior to d/c. 

   

   Problem List/Current Limitations:  

   Decreased activity cesar

   Decreased strength

   Decreased balance

   Shortness of breath



   Short Term Goals:  

   1: Pt to complete bed mobility with HOB flat and Khoa 

   2: Pt to complete transfers with Khoa and least restrictive AD 

   3: Pt to ambulate 150' with Khoa and least restrictive AD 

   4: Pt to asc/desc 1 platform stair with SBA to simulate curb negotiation 

   5: Pt to improve functional reach distance to >10 inches to indicate a 
decreased risk of falls. 

   Long Term Goals: Pt to d/c to appropriate d/c location with appropriate 
level of care 

   Patient Goals: To get stronger 

   Rehabilitation Prognosis:   Good



   Barriers for Discharge: High level of independence required to d/c to 
previous living situation 



PLAN: The patient will benefit from skilled physical therapy services 5 times 
per week for 2 weeks including: 

Therapeutic Exercise

Therapeutic Activities

Transfer Training

Gait Training

Stair Training

Manual Therapy

Safety Training

Neuromuscular Re-educ.

Pt/Caregiver Training

Bed Mobility





   

Thank you for this referral.  If you have any questions, concerns, or comments 
about this report or plan, please contact me at [phone].

Mellisa Smith, PT, DPT 
[facility]

## 2018-04-20 NOTE — MEDICAL NUTRITION THERAPY
Nutrition Anthropometrics


Weight (Pounds):  183


Weight (Calculated Kilograms):  83.007


BMI Calculated:  27.63


Gordon Nutrition Score:         Adequate 


Gordon Nutrition Risk Score:  18


Dietary Referral


Nutrition Risk Factors:      


Nutrition Risk Comment:





Nutritional Diagnosis


Nutritional Risk Acuity 3:  COPD Unstable


Past Medical History:  


HX of depression, anxiety, HTN, and GI bleed/ulcer, CKD- 3, COPD


Nutritional Acuity:  3-Mild


Nutrition Diagnosis:  Increased Nutrient Needs


Nutrition Etiology:  Inadeq. Food/Harper Intake


Nutrition Problem/Etiology/Sym:  


increased nutrient needs related to COPD with exacerbation on BiPAP during


the day.


Energy Requirement:  1860 (kcal/day (23 kcal/kg)- [place] St Jeor RMR (1292) AF 

1.2, IF 1.2)


Protein Requirement:  82 (g protein/day (1.0 g/kg)))


Fluid Requirement:  2470 (mL/day (30 mL/kg))


Diet Type:  Diet as Tolerated ANA/REG


Nutrition Intervention:  Cont diet as ordered, Encourage intake


Optional Order Time?:  No


Diet Comment To RSA:  


OFFER VANILLA NUTRITION SUPPLEMENT.





Nutrition Monitoring & Eval


Nutrition Goals:  Eat % Meal


Nutrition Follow-Up:  Good Intake


RD Patient Assessment Time:  30 minutes


RD Assessment Type:  RD Assessment


Patient Nutrition Acuity:  3-Mild


Follow Up Date:  Apr 24, 2018


Nutritional Comment:  


4/20 Pt continues on diet as tolerated consuming % of all meals. Pt


reports an increased appetite and that she has been increasing her intake


and tyring to consume more protein with meals. Pt states that she has come


to enjoy the vanilla ensure that she has been recieving. Pt reported


questions about decreasing her sodium intake. Writer explained to pt ways


to decrease sodium intake. Continue to monitor pt progress and intake.





Pt


states that she has been trying to increase her intake and consume a


protein on each meal tray. Pt states that she does not love the vanilla


ensure but has been tolerating drinking some of the ensure. Writer


encouraged pt to continue to drink ensure to increase protein intake to


accommodate for increased needs for healing. Continue to monitor pt intake


and clinical progress.











PEDRO RODRIGUEZ Apr 20, 2018 11:01

## 2018-04-20 NOTE — MEDICAL NUTRITION THERAPY
Nutrition Anthropometrics


Height (Inches):  64.00


Height (Calculated Centimeters:  162.374774


Weight (Pounds):  183


Weight (Calculated Kilograms):  83.007


BMI Calculated:  27.63


Gordon Nutrition Score:         Probably Inadequate 


Gordon Nutrition Risk Score:  16


Dietary Referral


Nutrition Risk Factors:      


Nutrition Risk Comment:





Nutritional Diagnosis


Nutritional Risk Acuity 3:  COPD Unstable


Past Medical History:  


HX of depression, anxiety, HTN, and GI bleed/ulcer, CKD- 3, COPD


Nutritional Acuity:  3-Mild


Nutrition Diagnosis:  Increased Nutrient Needs


Nutrition Etiology:  Inadeq. Food/Harper Intake


Nutrition Problem/Etiology/Sym:  


increased nutrient needs related to COPD with exacerbation on BiPAP during


the day.


Energy Requirement:  1860 (kcal/day (23 kcal/kg)- [place] St Jeor RMR (1292) AF 

1.2, IF 1.2)


Protein Requirement:  82 (g protein/day (1.0 g/kg))


Fluid Requirement:  2470 (mL/day (30 mL/kg))


Diet Type:  Diet as Tolerated ANA/REG


Nutrition Intervention:  Cont diet as ordered, Encourage intake


Food Likes:  if coffee is ordered give decaf for lunch and supper


Food Dislikes:  Dislikes chocolate ensure


Diet Comment To RSA:  


OFFER VANILLA NUTRITION SUPPLEMENT





Nutrition Monitoring & Eval


RD Patient Assessment Time:  30 minutes


RD Assessment Type:  RD Re-Assessment


Patient Nutrition Acuity:  3-Mild


Follow Up Date:  Apr 24, 2018


Nutritional Comment:  


Pt admitted for COPD exacerbation.   Pt on regualr diet but no intake  


reported at this time.  BNP elevated at 650.  BMI is in overwt range.   


Will cont to monitor and encourage intake.





4/8  Pt transferred to ICU after cardiac arrest episode.  , Alb  


2.9, High AST/ALT.  Pt NPO d/t intubation and sedation.  Plan to wean  


today.  Follow clinical progress, labs, etc.





4/10 Pt remains admitted in ICU on BiPAP. Pt diet advanced to ANA. Pt


currently tolerating clear liquids. BUN elevated at 42. Albumin was low


but is increasing. Pt intakes not currently meeting estimated need.


Continue to monitor and encourage increased intake.





4/12 Pt diet as tolerated. Recorded intakes report pt continues to only


consume liquids at this time. Writer visited pt room. Pt consuming eggs


with toast. Writer encouraged pt to consume protein on each meal tray to


encourage strength and healing. Pt reports that she has tried chocolate


ensure and dislikes the flavor but is willing to try vanilla ensure.


Writer will continue to monitor pt intake and progress.





4/16 Pt continues on diet as tolerated consuming % of all meals. Pt


states that she has been trying to increase her intake and consume a


protein on each meal tray. Pt states that she does not love the vanilla


ensure but has been tolerating drinking some of the ensure. Writer


encouraged pt to continue to drink ensure to increase protein intake to


accommodate for increased needs for healing. Continue to monitor pt intake


and clinical progress.





4/19 Pt reports that her appetite is returning and that she is having an


easier time consuming more protein and foods at meal times. Pt states that


she has been enjoying the food she has been given but would like more


flavor with her food other than salt. Writer offered to provide pt with


some Mrs. Lopez packets on her meal tray. Pt ammenable to trying Mrs. oLpez.


Pt reported that she was concerned she may need to consume less sodium due


to some water retention. Writer explained to pt that she is not on a low


sodium diet but gave pt brief diet ed on how to decrease sodium intake. Pt


ammenable. Continue to monitor pt intake and progress.











PEDRO RODRIGUEZ Apr 19, 2018 13:03

## 2018-04-21 VITALS — DIASTOLIC BLOOD PRESSURE: 70 MMHG | SYSTOLIC BLOOD PRESSURE: 137 MMHG

## 2018-04-21 VITALS — SYSTOLIC BLOOD PRESSURE: 145 MMHG | DIASTOLIC BLOOD PRESSURE: 69 MMHG

## 2018-04-21 RX ADMIN — ACETAMINOPHEN PRN MG: 325 TABLET ORAL at 20:59

## 2018-04-21 RX ADMIN — SPIRONOLACTONE SCH MG: 25 TABLET ORAL at 08:43

## 2018-04-21 RX ADMIN — FLUTICASONE PROPIONATE AND SALMETEROL SCH EACH: 50; 250 POWDER RESPIRATORY (INHALATION) at 17:08

## 2018-04-21 RX ADMIN — FUROSEMIDE SCH MG: 20 TABLET ORAL at 08:43

## 2018-04-21 RX ADMIN — LEVALBUTEROL HYDROCHLORIDE SCH MG: 1.25 SOLUTION RESPIRATORY (INHALATION) at 17:08

## 2018-04-21 RX ADMIN — PANTOPRAZOLE SODIUM SCH MG: 40 TABLET, DELAYED RELEASE ORAL at 08:42

## 2018-04-21 RX ADMIN — LEVALBUTEROL HYDROCHLORIDE SCH MG: 1.25 SOLUTION RESPIRATORY (INHALATION) at 05:52

## 2018-04-21 RX ADMIN — DILTIAZEM HYDROCHLORIDE SCH MG: 120 CAPSULE, EXTENDED RELEASE ORAL at 08:42

## 2018-04-21 RX ADMIN — LEVOTHYROXINE SODIUM SCH MG: 100 TABLET ORAL at 05:30

## 2018-04-21 RX ADMIN — POTASSIUM CHLORIDE SCH MEQ: 750 TABLET, FILM COATED, EXTENDED RELEASE ORAL at 08:42

## 2018-04-21 RX ADMIN — FLUTICASONE PROPIONATE AND SALMETEROL SCH EACH: 50; 250 POWDER RESPIRATORY (INHALATION) at 05:52

## 2018-04-21 RX ADMIN — LEVALBUTEROL HYDROCHLORIDE SCH MG: 1.25 SOLUTION RESPIRATORY (INHALATION) at 13:33

## 2018-04-21 RX ADMIN — DILTIAZEM HYDROCHLORIDE SCH MG: 120 CAPSULE, EXTENDED RELEASE ORAL at 20:59

## 2018-04-21 RX ADMIN — LEVALBUTEROL HYDROCHLORIDE SCH MG: 1.25 SOLUTION RESPIRATORY (INHALATION) at 09:29

## 2018-04-21 RX ADMIN — POTASSIUM CHLORIDE SCH MEQ: 750 TABLET, FILM COATED, EXTENDED RELEASE ORAL at 16:24

## 2018-04-21 RX ADMIN — ACETAMINOPHEN PRN MG: 325 TABLET ORAL at 03:33

## 2018-04-22 VITALS — DIASTOLIC BLOOD PRESSURE: 67 MMHG | SYSTOLIC BLOOD PRESSURE: 143 MMHG

## 2018-04-22 VITALS — SYSTOLIC BLOOD PRESSURE: 137 MMHG | DIASTOLIC BLOOD PRESSURE: 69 MMHG

## 2018-04-22 RX ADMIN — PANTOPRAZOLE SODIUM SCH MG: 40 TABLET, DELAYED RELEASE ORAL at 08:58

## 2018-04-22 RX ADMIN — POTASSIUM CHLORIDE SCH MEQ: 750 TABLET, FILM COATED, EXTENDED RELEASE ORAL at 17:38

## 2018-04-22 RX ADMIN — POTASSIUM CHLORIDE SCH MEQ: 750 TABLET, FILM COATED, EXTENDED RELEASE ORAL at 08:58

## 2018-04-22 RX ADMIN — ACETAMINOPHEN PRN MG: 325 TABLET ORAL at 21:23

## 2018-04-22 RX ADMIN — LEVALBUTEROL HYDROCHLORIDE SCH MG: 1.25 SOLUTION RESPIRATORY (INHALATION) at 09:27

## 2018-04-22 RX ADMIN — DILTIAZEM HYDROCHLORIDE SCH MG: 120 CAPSULE, EXTENDED RELEASE ORAL at 08:58

## 2018-04-22 RX ADMIN — LEVALBUTEROL HYDROCHLORIDE SCH MG: 1.25 SOLUTION RESPIRATORY (INHALATION) at 14:06

## 2018-04-22 RX ADMIN — FUROSEMIDE SCH MG: 20 TABLET ORAL at 08:57

## 2018-04-22 RX ADMIN — LEVOTHYROXINE SODIUM SCH MG: 100 TABLET ORAL at 06:44

## 2018-04-22 RX ADMIN — SPIRONOLACTONE SCH MG: 25 TABLET ORAL at 08:58

## 2018-04-22 RX ADMIN — LEVALBUTEROL HYDROCHLORIDE SCH MG: 1.25 SOLUTION RESPIRATORY (INHALATION) at 05:47

## 2018-04-22 RX ADMIN — LEVALBUTEROL HYDROCHLORIDE SCH MG: 1.25 SOLUTION RESPIRATORY (INHALATION) at 17:17

## 2018-04-22 RX ADMIN — DILTIAZEM HYDROCHLORIDE SCH MG: 120 CAPSULE, EXTENDED RELEASE ORAL at 21:23

## 2018-04-22 RX ADMIN — FLUTICASONE PROPIONATE AND SALMETEROL SCH EACH: 50; 250 POWDER RESPIRATORY (INHALATION) at 17:17

## 2018-04-22 RX ADMIN — FLUTICASONE PROPIONATE AND SALMETEROL SCH EACH: 50; 250 POWDER RESPIRATORY (INHALATION) at 05:47

## 2018-04-23 VITALS — DIASTOLIC BLOOD PRESSURE: 73 MMHG | SYSTOLIC BLOOD PRESSURE: 145 MMHG

## 2018-04-23 VITALS — SYSTOLIC BLOOD PRESSURE: 142 MMHG | DIASTOLIC BLOOD PRESSURE: 67 MMHG

## 2018-04-23 LAB — PLATELET COUNT, AUTOMATED: 130 K/UL (ref 150–450)

## 2018-04-23 RX ADMIN — LEVALBUTEROL HYDROCHLORIDE SCH MG: 1.25 SOLUTION RESPIRATORY (INHALATION) at 05:45

## 2018-04-23 RX ADMIN — FLUTICASONE PROPIONATE AND SALMETEROL SCH EACH: 50; 250 POWDER RESPIRATORY (INHALATION) at 17:10

## 2018-04-23 RX ADMIN — LEVOTHYROXINE SODIUM SCH MG: 100 TABLET ORAL at 05:53

## 2018-04-23 RX ADMIN — DILTIAZEM HYDROCHLORIDE SCH MG: 120 CAPSULE, EXTENDED RELEASE ORAL at 20:52

## 2018-04-23 RX ADMIN — LEVALBUTEROL HYDROCHLORIDE SCH MG: 1.25 SOLUTION RESPIRATORY (INHALATION) at 17:09

## 2018-04-23 RX ADMIN — POTASSIUM CHLORIDE SCH MEQ: 750 TABLET, FILM COATED, EXTENDED RELEASE ORAL at 17:37

## 2018-04-23 RX ADMIN — LEVALBUTEROL HYDROCHLORIDE SCH MG: 1.25 SOLUTION RESPIRATORY (INHALATION) at 09:08

## 2018-04-23 RX ADMIN — FLUTICASONE PROPIONATE AND SALMETEROL SCH EACH: 50; 250 POWDER RESPIRATORY (INHALATION) at 05:46

## 2018-04-23 RX ADMIN — POTASSIUM CHLORIDE SCH MEQ: 750 TABLET, FILM COATED, EXTENDED RELEASE ORAL at 08:35

## 2018-04-23 RX ADMIN — ACETAMINOPHEN PRN MG: 325 TABLET ORAL at 20:52

## 2018-04-23 RX ADMIN — FUROSEMIDE SCH MG: 20 TABLET ORAL at 08:34

## 2018-04-23 RX ADMIN — DILTIAZEM HYDROCHLORIDE SCH MG: 120 CAPSULE, EXTENDED RELEASE ORAL at 08:34

## 2018-04-23 RX ADMIN — PANTOPRAZOLE SODIUM SCH MG: 40 TABLET, DELAYED RELEASE ORAL at 08:36

## 2018-04-23 RX ADMIN — SPIRONOLACTONE SCH MG: 25 TABLET ORAL at 08:35

## 2018-04-23 RX ADMIN — LEVALBUTEROL HYDROCHLORIDE SCH MG: 1.25 SOLUTION RESPIRATORY (INHALATION) at 13:19

## 2018-04-23 NOTE — MEDICAL NUTRITION THERAPY
Nutrition Anthropometrics


Weight (Pounds):  183


Weight (Calculated Kilograms):  83.007


BMI Calculated:  27.63


Gordon Nutrition Score:         Adequate 


Gordon Nutrition Risk Score:  17


Dietary Referral


Nutrition Risk Factors:      


Nutrition Risk Comment:





Nutritional Diagnosis


Nutritional Risk Acuity 3:  COPD Unstable


Past Medical History:  


HX of depression, anxiety, HTN, and GI bleed/ulcer, CKD- 3, COPD


Nutritional Acuity:  3-Mild


Nutrition Diagnosis:  Increased Nutrient Needs


Nutrition Etiology:  Inadeq. Food/Harper Intake


Nutrition Problem/Etiology/Sym:  


increased nutrient needs related to COPD with exacerbation on BiPAP during


the day.


Energy Requirement:  1860 (kcal/day (23 kcal/kg)- [place] St Jeor RMR (1292) AF 

1.2, IF 1.2)


Protein Requirement:  82 (g protein/day (1.0 g/kg)))


Fluid Requirement:  2470 (mL/day (30 mL/kg))


Diet Type:  Diet as Tolerated ANA/REG


Nutrition Intervention:  Cont diet as ordered, Encourage intake, HS snack, 

Between meal supplement


Optional Order Time?:  No


Diet Comment To RSA:  


OFFER VANILLA NUTRITION SUPPLEMENT.





Nutrition Monitoring & Eval


Nutrition Goals:  Eat % Meal


Nutrition Follow-Up:  Fair Intake


RD Patient Assessment Time:  15 minutes


RD Assessment Type:  RD Re-Assessment


Patient Nutrition Acuity:  3-Mild


Follow Up Date:  May 1, 2018


Nutritional Comment:  


4/20 Pt continues on diet as tolerated consuming % of all meals. Pt


reports an increased appetite and that she has been increasing her intake


and tyring to consume more protein with meals. Pt states that she has come


to enjoy the vanilla ensure that she has been recieving. Writer


encouraged pt to continue to drink ensure to increase protein intake to


accommodate for increased needs for healing.  Pt reported


questions about decreasing her sodium intake. Writer explained to pt ways


to decrease sodium intake. Continue to monitor pt progress and intake.





4/23  Pt on regular diet.  Intake ranging 75- 100% of small to regular


portions.  Pt cont to accept nutr supplements.  No new wt.  Alb declined


from 3 to 2.8.  Will cont to offer nutr supplments and encourage intake.











RAY LEWIS Apr 23, 2018 15:15

## 2018-04-24 VITALS — DIASTOLIC BLOOD PRESSURE: 61 MMHG | SYSTOLIC BLOOD PRESSURE: 141 MMHG

## 2018-04-24 VITALS — DIASTOLIC BLOOD PRESSURE: 65 MMHG | SYSTOLIC BLOOD PRESSURE: 139 MMHG

## 2018-04-24 RX ADMIN — LEVALBUTEROL HYDROCHLORIDE SCH MG: 1.25 SOLUTION RESPIRATORY (INHALATION) at 13:40

## 2018-04-24 RX ADMIN — LEVALBUTEROL HYDROCHLORIDE SCH MG: 1.25 SOLUTION RESPIRATORY (INHALATION) at 17:06

## 2018-04-24 RX ADMIN — DILTIAZEM HYDROCHLORIDE SCH MG: 120 CAPSULE, EXTENDED RELEASE ORAL at 08:59

## 2018-04-24 RX ADMIN — LEVALBUTEROL HYDROCHLORIDE SCH MG: 1.25 SOLUTION RESPIRATORY (INHALATION) at 09:39

## 2018-04-24 RX ADMIN — LEVOTHYROXINE SODIUM SCH MG: 100 TABLET ORAL at 06:04

## 2018-04-24 RX ADMIN — ACETAMINOPHEN PRN MG: 325 TABLET ORAL at 20:52

## 2018-04-24 RX ADMIN — SPIRONOLACTONE SCH MG: 25 TABLET ORAL at 08:59

## 2018-04-24 RX ADMIN — PANTOPRAZOLE SODIUM SCH MG: 40 TABLET, DELAYED RELEASE ORAL at 08:59

## 2018-04-24 RX ADMIN — FUROSEMIDE SCH MG: 20 TABLET ORAL at 08:59

## 2018-04-24 RX ADMIN — POTASSIUM CHLORIDE SCH MEQ: 750 TABLET, FILM COATED, EXTENDED RELEASE ORAL at 08:59

## 2018-04-24 RX ADMIN — LEVALBUTEROL HYDROCHLORIDE SCH MG: 1.25 SOLUTION RESPIRATORY (INHALATION) at 06:26

## 2018-04-24 RX ADMIN — FLUTICASONE PROPIONATE AND SALMETEROL SCH EACH: 50; 250 POWDER RESPIRATORY (INHALATION) at 17:06

## 2018-04-24 RX ADMIN — FLUTICASONE PROPIONATE AND SALMETEROL SCH EACH: 50; 250 POWDER RESPIRATORY (INHALATION) at 06:26

## 2018-04-24 RX ADMIN — DILTIAZEM HYDROCHLORIDE SCH MG: 120 CAPSULE, EXTENDED RELEASE ORAL at 20:52

## 2018-04-24 RX ADMIN — POTASSIUM CHLORIDE SCH MEQ: 750 TABLET, FILM COATED, EXTENDED RELEASE ORAL at 17:00

## 2018-04-25 VITALS — SYSTOLIC BLOOD PRESSURE: 142 MMHG | DIASTOLIC BLOOD PRESSURE: 57 MMHG

## 2018-04-25 VITALS — DIASTOLIC BLOOD PRESSURE: 63 MMHG | SYSTOLIC BLOOD PRESSURE: 132 MMHG

## 2018-04-25 RX ADMIN — LEVALBUTEROL HYDROCHLORIDE SCH MG: 1.25 SOLUTION RESPIRATORY (INHALATION) at 14:05

## 2018-04-25 RX ADMIN — SPIRONOLACTONE SCH MG: 25 TABLET ORAL at 08:49

## 2018-04-25 RX ADMIN — LEVALBUTEROL HYDROCHLORIDE SCH MG: 1.25 SOLUTION RESPIRATORY (INHALATION) at 05:10

## 2018-04-25 RX ADMIN — FLUTICASONE PROPIONATE AND SALMETEROL SCH EACH: 50; 250 POWDER RESPIRATORY (INHALATION) at 05:10

## 2018-04-25 RX ADMIN — POTASSIUM CHLORIDE SCH MEQ: 750 TABLET, FILM COATED, EXTENDED RELEASE ORAL at 08:49

## 2018-04-25 RX ADMIN — FLUTICASONE PROPIONATE AND SALMETEROL SCH EACH: 50; 500 POWDER RESPIRATORY (INHALATION) at 17:01

## 2018-04-25 RX ADMIN — PANTOPRAZOLE SODIUM SCH MG: 40 TABLET, DELAYED RELEASE ORAL at 08:49

## 2018-04-25 RX ADMIN — FUROSEMIDE SCH MG: 20 TABLET ORAL at 08:49

## 2018-04-25 RX ADMIN — DILTIAZEM HYDROCHLORIDE SCH MG: 120 CAPSULE, EXTENDED RELEASE ORAL at 08:49

## 2018-04-25 RX ADMIN — ACETAMINOPHEN PRN MG: 325 TABLET ORAL at 20:50

## 2018-04-25 RX ADMIN — LEVALBUTEROL HYDROCHLORIDE SCH MG: 1.25 SOLUTION RESPIRATORY (INHALATION) at 17:01

## 2018-04-25 RX ADMIN — LEVOTHYROXINE SODIUM SCH MG: 100 TABLET ORAL at 05:15

## 2018-04-25 RX ADMIN — LEVALBUTEROL HYDROCHLORIDE SCH MG: 1.25 SOLUTION RESPIRATORY (INHALATION) at 09:41

## 2018-04-25 RX ADMIN — DILTIAZEM HYDROCHLORIDE SCH MG: 120 CAPSULE, EXTENDED RELEASE ORAL at 20:50

## 2018-04-25 NOTE — HOSPITALIST PROGRESS NOTE
Subjective


Progress Notes


Subjective


The patient denies new concerns. Continues to work with PT/OT.





Physical Exam





Vital Signs








  Date Time  Temp Pulse Resp B/P (MAP) Pulse Ox O2 Delivery O2 Flow Rate FiO2


 


4/25/18 09:47  82 18     


 


4/25/18 09:42     94 Nasal Cannula 2.5 


 


4/25/18 08:30        35.0


 


4/25/18 08:30 98.2   142/57 (85)    








General Appearance:  Alert, Awake, No Acute Distress


Neuro:  No Gross deficits


Eyes:  PERRLA


Cardiovascular:  Regular Rate and Rhythm, Other (Trace to 1+ pitting edema.)


GI:  Soft and Non-Tender


Integumentary:  Skin Intact without Lesion / Mass


Psych:  Appropriate Mood & Affect


Result Diagram:  


4/23/18 0525 4/23/18 0525








Assessment and Plan


Problems:  


(1) Generalized weakness


Status:  Acute


Assessment & Plan:  Due to multiple medical issues (see below). The patient is 

working with PT and OT.





(2) COPD with exacerbation


Status:  Acute


Assessment & Plan:  She does have a history of severe COPD and presented with 

increased shortness of breath. She was placed on treatment with nebulizers and 

IV Solu-Medrol. She was converted to oral prednisone on 2 occasions, but her 

wheezing worsened and she had to be placed back on IV steroids.  She is now 

back on a tapering dose of oral prednisone. She is getting nebulizer treatments 

4 times daily with as needed as well. CXR 4/15 continued to show bibasilar 

infiltrates. Her O2 requirements have decreased and she is currently on 2L per 

NC. She does wear BiPAP at HS. We started mobilizing, but she did have some 

difficulty tolerating and PT/OT recommended ongoing rehabilitative therapy. She 

was transferred to [facility].





(3) Right lower lobe pneumonia


Status:  Acute


Assessment & Plan:  Her chest x-ray revealed an infiltrate on the right.  She 

was started on treatment with ceftriaxone and azithromycin.  We converted her 

to oral cefdinir and azithromycin on 4/14. She completed therapy on 4/21/18.





(4) Heart failure with preserved left ventricular function (HFpEF)


Status:  Resolved


Assessment & Plan:   She did have an elevated BNP.  Her echocardiogram showed a 

preserved ejection fraction, but pulmonary hypertension.   Her weights remain 

elevated above her baseline.  We started her on scheduled oral Lasix initially 

and added spironolactone. She will need lab monitored periodically while on 

ECF.  





(5) Upper GI bleed


Status:  Acute


Assessment & Plan:  The patient has a history of PUD.  She had about 450cc of 

dark blood from the NG on 4/7/18.  Hgb did drop from 13.3 to 11.  Her Lovenox 

was stopped and she was given 2 units of FFP.  She was on Protonix and Carafate 

(we stopped Carafate).  Hgb has been stable.  





(6) Hypokalemia


Status:  Acute


Assessment & Plan:  She is on oral replacement and added spironolactone.  Her 

potassium level has increased to 4.4 on spironolactone and potassium 

replacement. Will decrease potassium to daily from bid and recheck labs on 04/ 28.





(7) Supraventricular tachycardia


Status:  Acute


Assessment & Plan:  She was noted to be in a narrow complex tachycardia during 

her code event.  She converted to sinus rhythm after receiving adenosine.  She 

continued to have short runs of SVT in the ICU.  She was placed on a diltiazem 

drip initially, but was converted to oral therapy on 4/11.  Dr. Abreu spoke 

with Cardiology (Dr. Thornton) who agreed with the plan and recommended 

Cardiology follow up as an outpatient.  He also recommended ASA, but because of 

her GI bleed, didn't think it was needed at this time (or Plavix). She 

continues to have some ectopy, but overall HR is improved. She is currently on 

max dose diltiazem ER 240mg BID.





(8) Cardiorespiratory arrest


Status:  Acute


Assessment & Plan:  She did suffer a pulseless arrest on 4/7/18.  This was 

likely secondary to SVT with rates over 200 by telemetry report.  Echo shows an 

EF of 55%, and no wall motion abnormalities.  The troponins were in the 

borderline area and relatively unchanged from admission.





(9) Acute respiratory failure


Status:  Resolved


Assessment & Plan:  She was intubated on 4/7/18 during a code.  She extubated 

herself on 4/9.  She currently is wearing BiPAP intermittently.





(10) Elevated troponin


Assessment & Plan:  She has had an elevated troponin, but these have not 

trended in a pattern consistent with infarction. 





(11) Hypertension


Status:  Chronic


Assessment & Plan:  She had been on diuretics, amlodipine and benazepril, which 

had been on hold.  She has been started on diltiazem as above.  We had 

restarted her benazepril, but then stopped again because of increasing 

creatinine and need for aggressive diuresis.





(12) Anxiety


Status:  Chronic


Assessment & Plan:  She has been on chronic fluoxetine and alprazolam.





(13) Depression


Status:  Chronic


Assessment & Plan:  She has been on the fluoxetine.





(14) Hypothyroidism


Status:  Chronic


Assessment & Plan:  She is on chronic treatment with Synthroid. 





(15) CKD (chronic kidney disease), stage III


Status:  Chronic


Assessment & Plan:  Her current creatinine is 1.2. Her BUN is elevated likely 

due to diuresis. Will continue to monitor closely.





Time Spent on Plan of Care:  < 30 min











GABBY RODRIGUEZ MD Apr 25, 2018 10:39

## 2018-04-26 VITALS — SYSTOLIC BLOOD PRESSURE: 149 MMHG | DIASTOLIC BLOOD PRESSURE: 75 MMHG

## 2018-04-26 VITALS — SYSTOLIC BLOOD PRESSURE: 139 MMHG | DIASTOLIC BLOOD PRESSURE: 70 MMHG

## 2018-04-26 RX ADMIN — POTASSIUM CHLORIDE SCH MEQ: 1500 TABLET, EXTENDED RELEASE ORAL at 09:31

## 2018-04-26 RX ADMIN — LEVALBUTEROL HYDROCHLORIDE SCH MG: 1.25 SOLUTION RESPIRATORY (INHALATION) at 05:22

## 2018-04-26 RX ADMIN — FLUTICASONE PROPIONATE AND SALMETEROL SCH EACH: 50; 500 POWDER RESPIRATORY (INHALATION) at 05:23

## 2018-04-26 RX ADMIN — SPIRONOLACTONE SCH MG: 25 TABLET ORAL at 09:33

## 2018-04-26 RX ADMIN — DILTIAZEM HYDROCHLORIDE SCH MG: 120 CAPSULE, EXTENDED RELEASE ORAL at 20:37

## 2018-04-26 RX ADMIN — FLUTICASONE PROPIONATE AND SALMETEROL SCH EACH: 50; 500 POWDER RESPIRATORY (INHALATION) at 17:03

## 2018-04-26 RX ADMIN — ACETAMINOPHEN PRN MG: 325 TABLET ORAL at 03:37

## 2018-04-26 RX ADMIN — ACETAMINOPHEN PRN MG: 325 TABLET ORAL at 20:36

## 2018-04-26 RX ADMIN — LEVALBUTEROL HYDROCHLORIDE SCH MG: 1.25 SOLUTION RESPIRATORY (INHALATION) at 09:36

## 2018-04-26 RX ADMIN — PANTOPRAZOLE SODIUM SCH MG: 40 TABLET, DELAYED RELEASE ORAL at 09:33

## 2018-04-26 RX ADMIN — FUROSEMIDE SCH MG: 20 TABLET ORAL at 09:32

## 2018-04-26 RX ADMIN — LEVOTHYROXINE SODIUM SCH MG: 100 TABLET ORAL at 05:37

## 2018-04-26 RX ADMIN — LEVALBUTEROL HYDROCHLORIDE SCH MG: 1.25 SOLUTION RESPIRATORY (INHALATION) at 17:03

## 2018-04-26 RX ADMIN — DILTIAZEM HYDROCHLORIDE SCH MG: 120 CAPSULE, EXTENDED RELEASE ORAL at 09:33

## 2018-04-26 NOTE — CONSULTATION
EVENT DATE:  April 26, 2018 



REASON FOR ADMISSION

Reason for admission initially to medical floor was elevated Beta natriuretic 
peptide on April 5, 2018 prior to transfer to extended care unit on April 19, 2018.  Patient was seen at approximately 1030 hours in the a.m. of April 26, 2018 for this dictation.  



REQUESTING PHYSICIAN 

Minda Graves MD 



CONSULTING PHYSICIAN 

Teddy Bell MD, Psychiatrist 



PRESENTING PROBLEM/CHIEF COMPLAINT  

"I should talk to someone about my anxiety concerning my ."  



HISTORY OF PRESENT ILLNESS 

This is a very pleasant 77-year-old female who was interviewed sitting upright 
in her chair in her room. Patient well groomed, making good eye contact.  
Patient had been visiting with nursing staff, interacting well.  Patient 
greeted this provider appropriately, indicated an understanding of why the 
consult was called.  The patient stated that maybe her son may have encouraged 
a psychiatric consult.  Again patient was very pleasant, quickly explaining 
that she is feeling anxious over her 90-year-old  who has been suffering 
from increasing dementia at home and is more and more difficult to take care of 
in the home.  Patient up until her own hospital admission of April 5, 2018, had 
been the sole care provider of him in the home.  Patient states that her 
 had a stroke about 10 years ago following a knee replacement surgery, 
and this started his decline.  Patient reports her  is able to attend to 
his ADLs pretty readily at home, but she feels that he will sit in a chair 
excessively and read and does not have much interest or maybe ability to do 
many things, and does again report signs and symptoms of increasing dementia 
and that he is very forgetful.  Patient herself reports that her recent stress 
of illness including bronchitis and pulmonary hypertension, and many other 
illnesses that currently she is being treated for (please see medical floor 
notes) has begun to take its toll on her mentally as well.  She reports being 
surprised by the rather lengthy hospitalization that she is still in.  Patient 
indicates that her sons, however, are very helpful with her in making future 
plans and decision making, and she continues to ponder long-term decisions that 
maybe she should move to a lower altitude in [place], [place] where her  
could live in an assisted living unit there, versus home health care here in 
[place] to help care for her .  Other than frustration with current 
circumstances, which patient readily identifies, she denies any significant 
depressive symptoms currently.  Denies any history of ricarda, psychosis, panic 
attacks, PTSD, or other symptoms of concern.  



MENTAL HEALTH HISTORY 

Patient originally was placed under psychiatric care at the time she was 
diagnosed with breast cancer in 1995, and she was placed on Xanax and Prozac at 
that time.  Patient continues to use these, has been on the same dose for quite 
some time.  Patient reports some underlying depression throughout her life, but 
falls shot of suicide attempts, but would come and go, but she does report that 
Prozac was helpful at the time as well as Xanax.  Patient has a history of 
working as a nurse. and indicates an understanding of medications.  Patient 
aware also that you can get tolerant to Xanax to where it is probably not that 
helpful anymore.  Patient recognizes this, in that she says when she does not 
get her Xanax at night she cannot sleep.  Much discussion took place regarding 
the use of benzodiazepines and patient's compromised respiratory system at this 
time as well.  Patient indicated an understanding.  



FAMILY PSYCHIATRIC HISTORY  

Patient reports her father may have suffered from some under treated or 
undiagnosed depression at times.  She herself again reported first experiencing 
depressive symptoms at times in high school.  She denies any alcohol or drug 
use in the family that she knows of, and no suicides in the family.



PAST MEDICAL HISTORY 

Significant again for breast cancer diagnosed in 1995, currently in remission.  
Patient suffering from COPD with acute exacerbation, right lower lobe pneumonia
, heart failure with preserved left ventricular function, hypokalemia, recent 
upper GI bleed, supraventricular tachycardia and patient may have suffered 
cardiorespiratory arrest on April 7, 2018.  She has had elevated troponins in 
the past, hypertension and hypothyroidism, as well as chronic kidney disease.  
Patient notably most recently prescribed prednisone and this may have some 
negative effects on her current mentation as well.  Again, patient being a long-
term nurse is aware of these possibilities.  



SOCIAL HISTORY 

Patient was born in [place], where she reported her father had gone to start a 
hospital.  Father was believed to be a physician.  Patient reports that after 
that she was raised primarily in [place].  Her parents were  at the 
time of her birth and remained together until their deaths.  Patient reports an 
"interesting" childhood overall.  Denies any neglect or abuse of any kind.  She 
is a middle child with five girls and two boys in the family.  She graduated 
high school, graduated college, worked as an RN.  No  service.  Patient 
has been  to her current  for 38 years.  She had three children 
from a previous marriage and he as well had three children from a previous 
marriage.  They currently live alone in their home here in [place]. 



LEGAL HISTORY 

She has no legal history.  



SUBSTANCE ABUSE HISTORY 

Patient is currently a smoker, but quit upon arrival into unit.  She states she 
does want to quit smoking.  She continues to have a glass of wine occasionally, 
but does not have any other substance abuse history.



PHYSICAL EXAMINATION

GENERAL:  Please see medical floor notes.  

VITAL SIGNS:  See the electronic record.



LABORATORY DATA 

See the electronic record.



MENTAL STATUS EXAMINATION

GENERAL APPEARANCE, BEHAVIOR AND ATTITUDE:  This is a very polite 77-year-old 
female in no acute medical distress, making good eye contact.  No periods of 
tearfulness.  No psychomotor agitation or retardation.  No bizarre mannerisms 
or tics.

SPEECH:  Within normal limits, regular rate, rhythm volume and tone.

MOOD:  Described as concerned over decisions to be made in the future. 

AFFECT:  Overall full and bright and mood congruent.

THOUGHT PROCESSES:  Appear goal directed, logical.  No loose associations or 
flight of ideas.

THOUGHT CONTENT:  Free of auditory or visual hallucinations, ideas of reference
, thought broadcastings, delusions, obsessions, compulsions.  Patient adamantly 
denying suicidal or homicidal ideation.

SENSORIUM:  Clear.

COGNITION:  Alert and oriented to person, place, time and situation.

MEMORY:  Immediate, recent and remote estimated intact.

INTELLIGENCE:   Average based on interview.

INSIGHT AND JUDGMENT:  Considered grossly intact and appropriate for outpatient 
psychiatric care when patient's medical needs are complete.  



ASSESSMENT  

This is a very polite, cooperative 77-year-old female.  Much time was spent 
with this very cooperative and pleasant patient, talking about the use of Xanax 
long term as she gets older with increased risk of falls and increased 
respiratory depression.  After lengthy discussion,  patient verbalized an 
understanding of many examples of other medications that could be used to help 
her rest at night in the absence of benzodiazepines.  Discussion took place 
regarding the potential increase of Prozac, which she has been on low dose for 
a long time.  Also addressed was the potential of using Wellbutrin to possibly 
help with potential depression and smoking cessation in this patient who will 
be trying to quit cigarettes when she leaves the unit.  Much time was spent 
addressing that the patient should consider an outpatient therapist as well, 
and information will be given to this patient concerning this when she 
discharged from the hospital in regard to her identifiable stressors in her 
life regarding potential placement of her  outside the home and herself 
moving, and overcoming her current illness.  After a long discussion, patient 
has decided not to change any medications at this time.  She recognizes that 
prednisone may also be a factor in any current departure from her baseline 
mental state.  



DIAGNOSES PER DSM-V

Adjustment disorder with depressed mood concerning her own illness, recognizing 
her own illness and the continued care of her elderly .  

Rule out substance-induced disorder secondary to prednisone, and patient does 
have a history of persisting depressive disorder and anxiety disorder in the 
past.  



PLAN

1.  Will continue current medications for now.  

2.  Patient has this provider's phone number and she can call any time to 
discuss medications further. 

3.  It would be recommended that after prednisone taper is complete, patient 
should further evaluate need for medication changes with outpatient provider.  

4.  It is recommended that patient have an outpatient therapist upon discharge, 
and behavioral health therapist has given information on [facility] program that would 
be beneficial in evaluating the concerns of her this patient as well as the 
abilities of her  within the home.  

5.  Will keep current medications as they are.  Patient has an appropriate 
understanding of medications and was receptive to long discussion concerning 
them.  

6.  Any further questions, please do not hesitate to call [phone].  



Thank you for this consult.  



CARLOS

## 2018-04-27 VITALS — SYSTOLIC BLOOD PRESSURE: 151 MMHG | DIASTOLIC BLOOD PRESSURE: 64 MMHG

## 2018-04-27 VITALS — DIASTOLIC BLOOD PRESSURE: 66 MMHG | SYSTOLIC BLOOD PRESSURE: 140 MMHG

## 2018-04-27 RX ADMIN — LEVALBUTEROL HYDROCHLORIDE SCH MG: 1.25 SOLUTION RESPIRATORY (INHALATION) at 11:38

## 2018-04-27 RX ADMIN — FUROSEMIDE SCH MG: 20 TABLET ORAL at 09:43

## 2018-04-27 RX ADMIN — LEVALBUTEROL HYDROCHLORIDE SCH MG: 1.25 SOLUTION RESPIRATORY (INHALATION) at 17:18

## 2018-04-27 RX ADMIN — LEVOTHYROXINE SODIUM SCH MG: 100 TABLET ORAL at 05:19

## 2018-04-27 RX ADMIN — FLUTICASONE PROPIONATE AND SALMETEROL SCH EACH: 50; 500 POWDER RESPIRATORY (INHALATION) at 17:18

## 2018-04-27 RX ADMIN — DILTIAZEM HYDROCHLORIDE SCH MG: 120 CAPSULE, EXTENDED RELEASE ORAL at 21:04

## 2018-04-27 RX ADMIN — DILTIAZEM HYDROCHLORIDE SCH MG: 120 CAPSULE, EXTENDED RELEASE ORAL at 09:43

## 2018-04-27 RX ADMIN — SPIRONOLACTONE SCH MG: 25 TABLET ORAL at 09:43

## 2018-04-27 RX ADMIN — PANTOPRAZOLE SODIUM SCH MG: 40 TABLET, DELAYED RELEASE ORAL at 09:43

## 2018-04-27 RX ADMIN — POTASSIUM CHLORIDE SCH MEQ: 1500 TABLET, EXTENDED RELEASE ORAL at 09:43

## 2018-04-27 RX ADMIN — ACETAMINOPHEN PRN MG: 325 TABLET ORAL at 02:47

## 2018-04-27 RX ADMIN — LEVALBUTEROL HYDROCHLORIDE SCH MG: 1.25 SOLUTION RESPIRATORY (INHALATION) at 05:39

## 2018-04-27 RX ADMIN — FLUTICASONE PROPIONATE AND SALMETEROL SCH EACH: 50; 500 POWDER RESPIRATORY (INHALATION) at 05:39

## 2018-04-28 VITALS — DIASTOLIC BLOOD PRESSURE: 71 MMHG | SYSTOLIC BLOOD PRESSURE: 147 MMHG

## 2018-04-28 VITALS — SYSTOLIC BLOOD PRESSURE: 141 MMHG | DIASTOLIC BLOOD PRESSURE: 63 MMHG

## 2018-04-28 LAB — PLATELET COUNT, AUTOMATED: 117 K/UL (ref 150–450)

## 2018-04-28 RX ADMIN — DILTIAZEM HYDROCHLORIDE SCH MG: 120 CAPSULE, EXTENDED RELEASE ORAL at 08:23

## 2018-04-28 RX ADMIN — PANTOPRAZOLE SODIUM SCH MG: 40 TABLET, DELAYED RELEASE ORAL at 08:23

## 2018-04-28 RX ADMIN — LEVALBUTEROL HYDROCHLORIDE SCH MG: 1.25 SOLUTION RESPIRATORY (INHALATION) at 11:33

## 2018-04-28 RX ADMIN — SPIRONOLACTONE SCH MG: 25 TABLET ORAL at 08:24

## 2018-04-28 RX ADMIN — ACETAMINOPHEN PRN MG: 325 TABLET ORAL at 21:26

## 2018-04-28 RX ADMIN — LEVALBUTEROL HYDROCHLORIDE SCH MG: 1.25 SOLUTION RESPIRATORY (INHALATION) at 18:28

## 2018-04-28 RX ADMIN — LEVOTHYROXINE SODIUM SCH MG: 100 TABLET ORAL at 06:09

## 2018-04-28 RX ADMIN — DILTIAZEM HYDROCHLORIDE SCH MG: 120 CAPSULE, EXTENDED RELEASE ORAL at 21:26

## 2018-04-28 RX ADMIN — FLUTICASONE PROPIONATE AND SALMETEROL SCH EACH: 50; 500 POWDER RESPIRATORY (INHALATION) at 05:58

## 2018-04-28 RX ADMIN — LEVALBUTEROL HYDROCHLORIDE SCH MG: 1.25 SOLUTION RESPIRATORY (INHALATION) at 05:58

## 2018-04-28 RX ADMIN — FUROSEMIDE SCH MG: 20 TABLET ORAL at 08:24

## 2018-04-28 RX ADMIN — POTASSIUM CHLORIDE SCH MEQ: 1500 TABLET, EXTENDED RELEASE ORAL at 08:23

## 2018-04-28 RX ADMIN — FLUTICASONE PROPIONATE AND SALMETEROL SCH EACH: 50; 500 POWDER RESPIRATORY (INHALATION) at 18:28

## 2018-04-29 VITALS — SYSTOLIC BLOOD PRESSURE: 134 MMHG | DIASTOLIC BLOOD PRESSURE: 75 MMHG

## 2018-04-29 VITALS — DIASTOLIC BLOOD PRESSURE: 61 MMHG | SYSTOLIC BLOOD PRESSURE: 143 MMHG

## 2018-04-29 RX ADMIN — SPIRONOLACTONE SCH MG: 25 TABLET ORAL at 08:57

## 2018-04-29 RX ADMIN — DILTIAZEM HYDROCHLORIDE SCH MG: 120 CAPSULE, EXTENDED RELEASE ORAL at 08:56

## 2018-04-29 RX ADMIN — LEVALBUTEROL HYDROCHLORIDE SCH MG: 1.25 SOLUTION RESPIRATORY (INHALATION) at 17:28

## 2018-04-29 RX ADMIN — LEVALBUTEROL HYDROCHLORIDE SCH MG: 1.25 SOLUTION RESPIRATORY (INHALATION) at 05:32

## 2018-04-29 RX ADMIN — DILTIAZEM HYDROCHLORIDE SCH MG: 120 CAPSULE, EXTENDED RELEASE ORAL at 21:09

## 2018-04-29 RX ADMIN — FLUTICASONE PROPIONATE AND SALMETEROL SCH EACH: 50; 500 POWDER RESPIRATORY (INHALATION) at 05:33

## 2018-04-29 RX ADMIN — POTASSIUM CHLORIDE SCH MEQ: 1500 TABLET, EXTENDED RELEASE ORAL at 08:56

## 2018-04-29 RX ADMIN — ACETAMINOPHEN PRN MG: 325 TABLET ORAL at 21:08

## 2018-04-29 RX ADMIN — LEVOTHYROXINE SODIUM SCH MG: 100 TABLET ORAL at 05:54

## 2018-04-29 RX ADMIN — PANTOPRAZOLE SODIUM SCH MG: 40 TABLET, DELAYED RELEASE ORAL at 08:57

## 2018-04-29 RX ADMIN — FUROSEMIDE SCH MG: 20 TABLET ORAL at 08:56

## 2018-04-29 RX ADMIN — LEVALBUTEROL HYDROCHLORIDE SCH MG: 1.25 SOLUTION RESPIRATORY (INHALATION) at 11:54

## 2018-04-29 RX ADMIN — FLUTICASONE PROPIONATE AND SALMETEROL SCH EACH: 50; 500 POWDER RESPIRATORY (INHALATION) at 17:28

## 2018-04-30 VITALS — DIASTOLIC BLOOD PRESSURE: 82 MMHG | SYSTOLIC BLOOD PRESSURE: 177 MMHG

## 2018-04-30 VITALS — SYSTOLIC BLOOD PRESSURE: 148 MMHG | DIASTOLIC BLOOD PRESSURE: 71 MMHG

## 2018-04-30 RX ADMIN — LEVALBUTEROL HYDROCHLORIDE SCH MG: 1.25 SOLUTION RESPIRATORY (INHALATION) at 17:09

## 2018-04-30 RX ADMIN — SPIRONOLACTONE SCH MG: 25 TABLET ORAL at 08:44

## 2018-04-30 RX ADMIN — POTASSIUM CHLORIDE SCH MEQ: 1500 TABLET, EXTENDED RELEASE ORAL at 08:44

## 2018-04-30 RX ADMIN — LEVALBUTEROL HYDROCHLORIDE SCH MG: 1.25 SOLUTION RESPIRATORY (INHALATION) at 05:47

## 2018-04-30 RX ADMIN — PANTOPRAZOLE SODIUM SCH MG: 40 TABLET, DELAYED RELEASE ORAL at 08:44

## 2018-04-30 RX ADMIN — FLUTICASONE PROPIONATE AND SALMETEROL SCH EACH: 50; 500 POWDER RESPIRATORY (INHALATION) at 17:09

## 2018-04-30 RX ADMIN — ACETAMINOPHEN PRN MG: 325 TABLET ORAL at 20:25

## 2018-04-30 RX ADMIN — LEVALBUTEROL HYDROCHLORIDE SCH MG: 1.25 SOLUTION RESPIRATORY (INHALATION) at 11:05

## 2018-04-30 RX ADMIN — DILTIAZEM HYDROCHLORIDE SCH MG: 120 CAPSULE, EXTENDED RELEASE ORAL at 08:44

## 2018-04-30 RX ADMIN — LEVOTHYROXINE SODIUM SCH MG: 100 TABLET ORAL at 05:59

## 2018-04-30 RX ADMIN — FUROSEMIDE SCH MG: 20 TABLET ORAL at 08:44

## 2018-04-30 RX ADMIN — FLUTICASONE PROPIONATE AND SALMETEROL SCH EACH: 50; 500 POWDER RESPIRATORY (INHALATION) at 05:47

## 2018-04-30 RX ADMIN — DILTIAZEM HYDROCHLORIDE SCH MG: 120 CAPSULE, EXTENDED RELEASE ORAL at 20:25

## 2018-04-30 NOTE — MEDICAL NUTRITION THERAPY
Nutrition Anthropometrics


Height (Inches):  64.00


Height (Calculated Centimeters:  162.345734


Weight (Pounds):  165


Weight (Calculated Kilograms):  74.843


BMI Calculated:  27.63


Gordon Nutrition Score:         Adequate 


Gordon Nutrition Risk Score:  18


Dietary Referral


Nutrition Risk Factors:      


Nutrition Risk Comment:





Nutritional Diagnosis


Nutritional Risk Acuity 3:  COPD Unstable


Past Medical History:  


HX of depression, anxiety, HTN, and GI bleed/ulcer, CKD- 3, COPD


Nutritional Acuity:  3-Mild


Nutrition Diagnosis:  Increased Nutrient Needs


Nutrition Etiology:  Inadeq. Food/Harper Intake


Nutrition Problem/Etiology/Sym:  


increased nutrient needs related to COPD with exacerbation on BiPAP during


the day.


Energy Requirement:  1860 (kcal/day (23 kcal/kg)- [place] St Jeor RMR (1292) AF 

1.2, IF 1.2)


Protein Requirement:  82 (g protein/day (1.0 g/kg)))


Fluid Requirement:  2470 (mL/day (30 mL/kg))


Diet Type:  Diet as Tolerated ANA/REG


Nutrition Intervention:  Cont diet as ordered, Encourage intake, HS snack, 

Between meal supplement


Optional Order Time?:  No


Additional Diet Restrictions:  BRKFT AT 8:00


Diet Comment To RSA:  


OFFER VANILLA NUTRITION SUPPLEMENT.





Nutrition Monitoring & Eval


Nutrition Goals:  Eat % Meal


Nutrition Follow-Up:  Good Intake


RD Patient Assessment Time:  15 minutes


RD Assessment Type:  RD Re-Assessment


Patient Nutrition Acuity:  3-Mild


Follow Up Date:  May 8, 2018


Nutritional Comment:  


4/20 Pt continues on diet as tolerated consuming % of all meals. Pt


reports an increased appetite and that she has been increasing her intake


and tyring to consume more protein with meals. Pt states that she has come


to enjoy the vanilla ensure that she has been recieving. Writer


encouraged pt to continue to drink ensure to increase protein intake to


accommodate for increased needs for healing.  Pt reported


questions about decreasing her sodium intake. Writer explained to pt ways


to decrease sodium intake. Continue to monitor pt progress and intake. EARLE





4/23  Pt on regular diet.  Intake ranging 75- 100% of small to regular


portions.  Pt cont to accept nutr supplements.  No new wt.  Alb declined


from 3 to 2.8.  Will cont to offer nutr supplments and encourage intake.


BK





4/30 Pt cont on regular diet.  Eating 75- 100%.  Wt has fluctuated.   Pt


weighted 161# on admission on med unit.   Wt did increase wot 190# with


edema reported and prednisone use.  Wt of 165# has declined back almost to


admit wt.  Pt cont 1+ edema to legs.  Anticipate further wt loss when


edema resolved.  RAY POOLE Apr 30, 2018 16:32

## 2018-05-01 VITALS — SYSTOLIC BLOOD PRESSURE: 143 MMHG | DIASTOLIC BLOOD PRESSURE: 46 MMHG

## 2018-05-01 VITALS — SYSTOLIC BLOOD PRESSURE: 142 MMHG | DIASTOLIC BLOOD PRESSURE: 64 MMHG

## 2018-05-01 RX ADMIN — LEVALBUTEROL HYDROCHLORIDE SCH MG: 1.25 SOLUTION RESPIRATORY (INHALATION) at 10:59

## 2018-05-01 RX ADMIN — FUROSEMIDE SCH MG: 20 TABLET ORAL at 09:40

## 2018-05-01 RX ADMIN — SPIRONOLACTONE SCH MG: 25 TABLET ORAL at 09:40

## 2018-05-01 RX ADMIN — POTASSIUM CHLORIDE SCH MEQ: 1500 TABLET, EXTENDED RELEASE ORAL at 09:39

## 2018-05-01 RX ADMIN — LEVALBUTEROL HYDROCHLORIDE SCH MG: 1.25 SOLUTION RESPIRATORY (INHALATION) at 05:12

## 2018-05-01 RX ADMIN — FLUTICASONE PROPIONATE AND SALMETEROL SCH EACH: 50; 500 POWDER RESPIRATORY (INHALATION) at 16:51

## 2018-05-01 RX ADMIN — DILTIAZEM HYDROCHLORIDE SCH MG: 120 CAPSULE, EXTENDED RELEASE ORAL at 09:40

## 2018-05-01 RX ADMIN — LEVOTHYROXINE SODIUM SCH MG: 100 TABLET ORAL at 06:09

## 2018-05-01 RX ADMIN — DILTIAZEM HYDROCHLORIDE SCH MG: 120 CAPSULE, EXTENDED RELEASE ORAL at 20:47

## 2018-05-01 RX ADMIN — PANTOPRAZOLE SODIUM SCH MG: 40 TABLET, DELAYED RELEASE ORAL at 09:39

## 2018-05-01 RX ADMIN — FLUTICASONE PROPIONATE AND SALMETEROL SCH EACH: 50; 500 POWDER RESPIRATORY (INHALATION) at 05:12

## 2018-05-01 RX ADMIN — ACETAMINOPHEN PRN MG: 325 TABLET ORAL at 20:47

## 2018-05-01 RX ADMIN — LEVALBUTEROL HYDROCHLORIDE SCH MG: 1.25 SOLUTION RESPIRATORY (INHALATION) at 16:52

## 2018-05-02 VITALS — DIASTOLIC BLOOD PRESSURE: 65 MMHG | SYSTOLIC BLOOD PRESSURE: 148 MMHG

## 2018-05-02 VITALS — DIASTOLIC BLOOD PRESSURE: 54 MMHG | SYSTOLIC BLOOD PRESSURE: 144 MMHG

## 2018-05-02 LAB — PLATELET COUNT, AUTOMATED: 126 K/UL (ref 150–450)

## 2018-05-02 RX ADMIN — Medication SCH GM: at 17:07

## 2018-05-02 RX ADMIN — POTASSIUM CHLORIDE SCH MEQ: 1500 TABLET, EXTENDED RELEASE ORAL at 09:23

## 2018-05-02 RX ADMIN — SPIRONOLACTONE SCH MG: 25 TABLET ORAL at 09:24

## 2018-05-02 RX ADMIN — FLUTICASONE PROPIONATE AND SALMETEROL SCH EACH: 50; 500 POWDER RESPIRATORY (INHALATION) at 17:08

## 2018-05-02 RX ADMIN — LEVOTHYROXINE SODIUM SCH MG: 100 TABLET ORAL at 05:21

## 2018-05-02 RX ADMIN — DILTIAZEM HYDROCHLORIDE SCH MG: 120 CAPSULE, EXTENDED RELEASE ORAL at 09:23

## 2018-05-02 RX ADMIN — LEVALBUTEROL HYDROCHLORIDE SCH MG: 1.25 SOLUTION RESPIRATORY (INHALATION) at 05:13

## 2018-05-02 RX ADMIN — ACETAMINOPHEN PRN MG: 325 TABLET ORAL at 20:56

## 2018-05-02 RX ADMIN — DILTIAZEM HYDROCHLORIDE SCH MG: 120 CAPSULE, EXTENDED RELEASE ORAL at 20:56

## 2018-05-02 RX ADMIN — FUROSEMIDE SCH MG: 20 TABLET ORAL at 09:24

## 2018-05-02 RX ADMIN — PANTOPRAZOLE SODIUM SCH MG: 40 TABLET, DELAYED RELEASE ORAL at 09:23

## 2018-05-02 RX ADMIN — FLUTICASONE PROPIONATE AND SALMETEROL SCH EACH: 50; 500 POWDER RESPIRATORY (INHALATION) at 05:13

## 2018-05-02 RX ADMIN — LEVALBUTEROL HYDROCHLORIDE SCH MG: 1.25 SOLUTION RESPIRATORY (INHALATION) at 11:30

## 2018-05-02 NOTE — HOSPITALIST PROGRESS NOTE
Subjective


Progress Notes


Subjective


Feeling better overall. No new concerns. Getting stronger.





Physical Exam





Vital Signs








  Date Time  Temp Pulse Resp B/P (MAP) Pulse Ox O2 Delivery O2 Flow Rate FiO2


 


5/2/18 11:39  71 16     


 


5/2/18 11:30     94 Nasal Cannula 2.0 


 


5/2/18 11:14        35.0


 


5/2/18 09:20 98.2   144/54 (84)    














Intake and Output 


 


 5/3/18





 07:00


 


Intake Total 720 ml


 


Balance 720 ml


 


 


 


Intake Oral 720 ml


 


# Voids 1








General Appearance:  Alert, Awake, No Acute Distress


Neuro:  No Gross deficits


Cardiovascular:  Regular Rate and Rhythm


Respiratory:  Other (Markedly decreased BS throughout without rales, rhonchi or 

wheezing.)


Extremities:  Warm, Perfused, Other (1+ edema)


Integumentary:  Skin Intact without Lesion / Mass


Psych:  Appropriate Mood & Affect


Result Diagram:  


5/2/18 0533 5/2/18 0533








Assessment and Plan


Problems:  


(1) Generalized weakness


Status:  Acute


Assessment & Plan:  The patient developed generalized weakness due to multiple 

medical issues (see below). The patient was transferred to [facility] to work with PT 

and OT. She improved and likely will be able to discharge to home with ongoing 

PT and OT through Home Health at the end of the week.





(2) COPD with exacerbation


Status:  Acute


Assessment & Plan:  The patient has a history of severe COPD and presented with 

increased shortness of breath. See previous inpatient admission for details. 

She was initially on IV steroids and then switched to a tapering dose of oral 

prednisone. She was placed on nebulizer (Xopenex) treatments 3 times daily with 

prn treatments as well. CXR performed on 4/15 continued to show bibasilar 

infiltrates. Her O2 requirements decreased and she returned to her baseline O2 

requirement of 2L per NC. She has been wearing BiPAP at  while hospitalized 

and will need a split night sleep study shortly after discharge. Will switch 

from Xopenex nebs to Xopenex inhaler tonight to see how she does. 





(3) Right lower lobe pneumonia


Status:  Acute


Assessment & Plan:  Chest x-ray revealed an infiltrate on the right.  She was 

started on treatment with ceftriaxone and azithromycin.  She was then converted

  to oral cefdinir and azithromycin on 4/14. She completed therapy on 4/21/18.





(4) Heart failure with preserved left ventricular function (HFpEF)


Status:  Resolved


Assessment & Plan:   She did have an elevated BNP.  Her echocardiogram showed a 

preserved ejection fraction, but pulmonary hypertension.   Her weights remain 

elevated above her baseline.  She was started on scheduled oral Lasix initially 

and spironolactone was added. Her weight and edema were gradually improving at 

the time of discharge.





(5) Upper GI bleed


Status:  Acute


Assessment & Plan:  The patient has a history of PUD.  She had about 450cc of 

dark blood from the NG on 4/7/18.  Hgb did drop from 13.3 to 11.  Her Lovenox 

was stopped and she was given 2 units of FFP.  She was placed on Protonix and 

Carafate. Carafate was later stopped. Hgb remained stable.  





(6) Hypokalemia


Status:  Acute


Assessment & Plan:  She was placed on oral replacement and spironolactone was 

added.  Her potassium level normalized on spironolactone and potassium 

replacement. 





(7) Supraventricular tachycardia


Status:  Acute


Assessment & Plan:  The patient had a pulseless episode while on the medical 

floor. She was noted to be in a narrow complex tachycardia on the medical floor 

during her code event.  She converted to sinus rhythm after receiving 

adenosine.  She continued to have short runs of SVT while in the ICU.  She was 

placed on a diltiazem drip initially, but was converted to oral therapy on 4/ 11. Cardiology was consulted and agreed with diltiazem therapy and also 

recommended ASA. Because of GI bleeding this was not started. She continued to 

have some ectopy, but overall her heart rate improved. She will be discharged 

on maximum dose diltiazem ER 240mg BID. She will follow up with cardiology as 

an outpatient.





(8) Cardiorespiratory arrest


Status:  Acute


Assessment & Plan:  She did suffer a pulseless arrest on 4/7/18.  This was 

likely secondary to SVT with rates over 200 by telemetry report.  Echo showed 

an EF of 55%, and no wall motion abnormalities.  The troponins were in the 

borderline area and relatively unchanged from admission.





(9) Acute respiratory failure


Status:  Resolved


Assessment & Plan:  She was intubated on 4/7/18 during a code.  She extubated 

herself on 4/9.  She continued to wear BiPAP intermittently with good result 

and will have a sleep study after discharge.





(10) Elevated troponin


Assessment & Plan:  She has had an elevated troponin, but did not have a 

pattern consistent with infarction. 





(11) Hypertension


Status:  Chronic


Assessment & Plan:  She had been on diuretics, amlodipine and benazepril.  She 

was started on diltiazem as above.  Her benazepril was stopped but her 

diuretics were restarted. Her BP has been under good control with diuretics and 

diltiazem (see above).





(12) Anxiety


Status:  Chronic


Assessment & Plan:  She was continued on fluoxetine and alprazolam.





(13) Depression


Status:  Chronic


Assessment & Plan:  She was continued on the fluoxetine.





(14) Hypothyroidism


Status:  Chronic


Assessment & Plan:  She was continued on chronic treatment with Synthroid. 





(15) CKD (chronic kidney disease), stage III


Status:  Chronic


Assessment & Plan:  Her current creatinine on 05/02/18 was 1.2. Her BUN was 

elevated likely due to diuresis. 





Time Spent on Plan of Care:  < 30 min











GABBY RODRIGUEZ MD May 2, 2018 16:27

## 2018-05-02 NOTE — HOSPITALIST DEPART
Discharge Summary


Reason for Hosp/Final Diag:  


(1) Generalized weakness


Status:  Acute


Hospital Course & Plan:  The patient developed generalized weakness due to 

multiple medical issues (see below). The patient was transferred to [facility] to work 

with PT and OT. She improved and was able to discharge to home with ongoing PT 

and OT through Home Health.





(2) COPD with exacerbation


Status:  Acute


Hospital Course & Plan:  The patient has a history of severe COPD and presented 

with increased shortness of breath. See previous inpatient admission for 

details. She was initially on IV steroids and then switched to a tapering dose 

of oral prednisone. She was placed on nebulizer (Xopenex) treatments 3 times 

daily with prn treatments as well. CXR performed on 4/15 continued to show 

bibasilar infiltrates. Her O2 requirements decreased and she returned to her 

baseline O2 requirement of 2L per NC. She did wear BiPAP at HS while 

hospitalized and will need a split night sleep study shortly after discharge. 





(3) Right lower lobe pneumonia


Status:  Acute


Hospital Course & Plan:  Chest x-ray revealed an infiltrate on the right.  She 

was started on treatment with ceftriaxone and azithromycin.  She was then 

converted  to oral cefdinir and azithromycin on 4/14. She completed therapy on 4 /21/18.





(4) Heart failure with preserved left ventricular function (HFpEF)


Status:  Resolved


Hospital Course & Plan:   She did have an elevated BNP.  Her echocardiogram 

showed a preserved ejection fraction, but pulmonary hypertension.   Her weights 

remain elevated above her baseline.  She was started on scheduled oral Lasix 

initially and spironolactone was added. Her weight and edema were gradually 

improving at the time of discharge.





(5) Upper GI bleed


Status:  Acute


Hospital Course & Plan:  The patient has a history of PUD.  She had about 450cc 

of dark blood from the NG on 4/7/18.  Hgb did drop from 13.3 to 11.  Her 

Lovenox was stopped and she was given 2 units of FFP.  She was placed on 

Protonix and Carafate. Carafate was later stopped. Hgb remained stable.  





(6) Hypokalemia


Status:  Acute


Hospital Course & Plan:  She was placed on oral replacement and spironolactone 

was added.  Her potassium level normalized on spironolactone and potassium 

replacement. 





(7) Supraventricular tachycardia


Status:  Acute


Hospital Course & Plan:  The patient had a pulseless episode while on the 

medical floor. She was noted to be in a narrow complex tachycardia on the 

medical floor during her code event.  She converted to sinus rhythm after 

receiving adenosine.  She continued to have short runs of SVT while in the ICU.

  She was placed on a diltiazem drip initially, but was converted to oral 

therapy on 4/11. Cardiology was consulted and agreed with diltiazem therapy and 

also recommended ASA. Because of GI bleeding this was not started. She 

continued to have some ectopy, but overall her heart rate improved. She will be 

discharged on maximum dose diltiazem ER 240mg BID. She will follow up with 

cardiology as an outpatient.





(8) Cardiorespiratory arrest


Status:  Acute


Hospital Course & Plan:  She did suffer a pulseless arrest on 4/7/18.  This was 

likely secondary to SVT with rates over 200 by telemetry report.  Echo showed 

an EF of 55%, and no wall motion abnormalities.  The troponins were in the 

borderline area and relatively unchanged from admission.





(9) Acute respiratory failure


Status:  Resolved


Hospital Course & Plan:  She was intubated on 4/7/18 during a code.  She 

extubated herself on 4/9.  She continued to wear BiPAP intermittently with good 

result and will have a sleep study after discharge.





(10) Elevated troponin


Hospital Course & Plan:  She has had an elevated troponin, but did not have a 

pattern consistent with infarction. 





(11) Hypertension


Status:  Chronic


Hospital Course & Plan:  She had been on diuretics, amlodipine and benazepril.  

She was started on diltiazem as above.  Her benazepril was stopped but her 

diuretics were restarted. Her BP has been under good control with diuretics and 

diltiazem (see above).





(12) Anxiety


Status:  Chronic


Hospital Course & Plan:  She was continued on fluoxetine and alprazolam.





(13) Depression


Status:  Chronic


Hospital Course & Plan:  She was continued on the fluoxetine.





(14) Hypothyroidism


Status:  Chronic


Hospital Course & Plan:  She was continued on chronic treatment with Synthroid. 





(15) CKD (chronic kidney disease), stage III


Status:  Chronic


Hospital Course & Plan:  Her current creatinine on 05/02/18 was 1.2. Her BUN 

was elevated likely due to diuresis. 





Departure


Weight (Pounds):  162


Weight (Ounces):  2.0


Result Diagram:  


5/2/18 0533 5/2/18 0533





Condition:  Improved


Discharge:  Home, Home Health


PT/OT Follow Up For:  PT For Strengthening, OT Evaluation and Treat


Home Health RN Follow Up For:  Nursing Assessment


Home Health CNA Follow Up For:  ADL Assistance


Discharge Code Status:  Full Code


Time Spent:  < 30 min





Discharge Instructions


Home Meds


Active Scripts


Levalbuterol Tartrate (XOPENEX HFA) 15 Gm Hfa.aer.ad, 2 PUFF INH TIDR, #1 

INHALER 2 Refills


   Prov:GABBY RODRIGUEZ MD         5/2/18


Spironolactone (SPIRONOLACTONE) 25 Mg Tablet, 25 MG PO QDAY, #30 TAB 2 Refills


   Prov:GABBY RODRIGUEZ MD         5/2/18


Pantoprazole Sodium (PANTOPRAZOLE SODIUM) 40 Mg Tablet.dr, 40 MG PO QDAY, #30 

TAB 2 Refills


   Prov:GABBY RODRIGUEZ MD         5/2/18


Prednisone 10 Mg Tab (PREDNISONE 10 MG TAB) 10 Mg Tablet, 10 MG PO QDAY for 4 

Days, #4 TAB


   Prov:GABBY RODRIGUEZ MD         5/2/18


Potassium Chloride (POTASSIUM CHLORIDE) 20 Meq Tab.er.prt, 20 MEQ PO DAILY, #30 

TAB 2 Refills


   Prov:GABBY RODRIGUEZ MD         5/2/18


Fluticasone/Salmeterol (ADVAIR 500-50 DISKUS) 1 Each Disk.w.dev, 1 EACH INH BIDR

, #1 DISK


   Prov:GABBY RODRIGUEZ MD         5/2/18


Spironolactone (SPIRONOLACTONE) 25 Mg Tablet, 25 MG PO QDAY for 30 Days, TAB


   Prov:ANDRE RODRIGUEZ MD         4/19/18


Pantoprazole Sodium (PANTOPRAZOLE SODIUM) 40 Mg Tablet.dr, 40 MG PO QDAY for 30 

Days, TAB


   Prov:ANDRE RODRIGUEZ MD         4/19/18


Furosemide (FUROSEMIDE) 20 Mg Tablet, 20 MG PO QDAY for 30 Days, TAB


   Prov:ANDRE RODRIGUEZ MD         4/19/18


Diltiazem Hcl (DILTIAZEM 24HR CD) 120 Mg Cap.er.24h, 240 MG PO BID for 30 Days, 

CAP


   Prov:ANDRE RODRIGUEZ MD         4/19/18


Alprazolam 0.5 Mg Tab (ALPRAZOLAM 0.5 MG TAB) 0.5 Mg Tablet, 0.5 MG PO HS for 

30 Days, TAB


   Prov:ANDRE RODRIGUEZ MD         4/19/18


Acetaminophen (MAPAP) 325 Mg Tablet, 650 MG PO Q6H Y for FEVER/PAIN for 30 Days

, TAB


   Prov:ANDRE RODRIGUEZ MD         4/19/18


Fluoxetine Hcl (PROZAC) 20 Mg Capsule, 20 MG PO QDAY, #90 CAPSULE 1 Refill


   Prov:ASHLYN FOSTER MD         3/13/18


Reported Medications


Vit A/Vit C/Vit E/Zinc/Copper (PRESERVISION AREDS SOFTGEL) 1 Each Capsule, 1 

EACH PO DAILY, CAPSULE


   4/5/18


Oxygen (OXYGEN)  Inha, 2 L INH, L


   12/29/17


Levothyroxine Sodium (LEVOTHYROXINE SODIUM) 100 Mcg Tablet, 100 MCG PO QDAY, TAB


   7/12/17


Discontinued Reported Medications


Diltiazem HCl (Diltiazem 24Hr Cd) 300 Mg Cap.er.24h, PO DAILY for 30 Days, 2 

Refills


   5/4/18


Triamterene/Hydrochlorothiazid (TRIAMTERENE-HCTZ 37.5-25 MG TB) 1 Each Tablet, 

1 EACH PO DAILY


   4/20/18


Discontinued Scripts


Prednisone (PREDNISONE) 20 Mg Tablet, 40 MG PO QDAY for 7 Days, TAB


   Prov:ANDRE RODRIGUEZ MD         4/19/18


Potassium Chloride (KLOR-CON 10) 10 Meq Tablet.er, 20 MEQ PO BIDBS for 10 Days, 

TAB


   Prov:ANDRE RODRIGUEZ MD         4/19/18


Cefdinir 300 Mg Cap (OMNICEF 300 MG CAP (OR EQUIV)) 300 Mg Cap, 300 MG PO BID 

for 2 Days, CAP


   Prov:ANDRE RODRIGUEZ MD         4/19/18


Fluticasone/Salmeterol (ADVAIR 250-50 DISKUS) 1 Each Disk.w.dev, 1 PUFF IH BID, 

#3 INH 3 Refills


   Prov:ASHLYN FOSTER MD         2/12/18


Follow up Referrals:  


Internal Medicine - In One Week @ [facility] with Ashlyn Foster Md





Diet:  Regular


Activity:  As Tolerated


Special Instructions:  


The patient is to follow up with cardiology, first available apppointment,


after discharge.


Copies to:   ASHLYN FOSTER MD; THAIS KELLY MD





Venous Thromboembolism


Antithrombotics


Is Pt On Any Antithrombotics?:  No





Face-to-Face Certification


Face to Face


Home Health Certification


Institutional Provider conducted the face-to-face encounter.


Electronic Undersigning Physician Certifies Home Health.  


I certify that the patient has been under my care and that I had a face-to-face 

encounter that meets the physician face-to-face encounter requirements with 

this patient.  


This patient is home-bound due to safety issues and continues to require 

assistance with ADL's.


I certify that based on my findings, that Nursing, Aides and the following Home 

Health services are medically necessary: PT/OT


Medical Necessity:  Nursing, Rehab


Date Face to Face Conducted:  May 2, 2018











GABBY RODRIGUEZ MD May 2, 2018 16:17

## 2018-05-03 VITALS — DIASTOLIC BLOOD PRESSURE: 68 MMHG | SYSTOLIC BLOOD PRESSURE: 137 MMHG

## 2018-05-03 VITALS — DIASTOLIC BLOOD PRESSURE: 57 MMHG | SYSTOLIC BLOOD PRESSURE: 150 MMHG

## 2018-05-03 RX ADMIN — FUROSEMIDE SCH MG: 20 TABLET ORAL at 09:49

## 2018-05-03 RX ADMIN — ACETAMINOPHEN PRN MG: 325 TABLET ORAL at 21:16

## 2018-05-03 RX ADMIN — Medication SCH GM: at 17:13

## 2018-05-03 RX ADMIN — LEVOTHYROXINE SODIUM SCH MG: 100 TABLET ORAL at 05:43

## 2018-05-03 RX ADMIN — FLUTICASONE PROPIONATE AND SALMETEROL SCH EACH: 50; 500 POWDER RESPIRATORY (INHALATION) at 05:38

## 2018-05-03 RX ADMIN — SPIRONOLACTONE SCH MG: 25 TABLET ORAL at 09:49

## 2018-05-03 RX ADMIN — POTASSIUM CHLORIDE SCH MEQ: 1500 TABLET, EXTENDED RELEASE ORAL at 09:49

## 2018-05-03 RX ADMIN — Medication SCH GM: at 11:07

## 2018-05-03 RX ADMIN — PANTOPRAZOLE SODIUM SCH MG: 40 TABLET, DELAYED RELEASE ORAL at 09:48

## 2018-05-03 RX ADMIN — Medication SCH GM: at 05:38

## 2018-05-03 RX ADMIN — FLUTICASONE PROPIONATE AND SALMETEROL SCH EACH: 50; 500 POWDER RESPIRATORY (INHALATION) at 17:12

## 2018-05-03 RX ADMIN — DILTIAZEM HYDROCHLORIDE SCH MG: 120 CAPSULE, EXTENDED RELEASE ORAL at 09:48

## 2018-05-03 RX ADMIN — DILTIAZEM HYDROCHLORIDE SCH MG: 120 CAPSULE, EXTENDED RELEASE ORAL at 21:16

## 2018-05-04 VITALS — DIASTOLIC BLOOD PRESSURE: 64 MMHG | SYSTOLIC BLOOD PRESSURE: 149 MMHG

## 2018-05-04 RX ADMIN — FUROSEMIDE SCH MG: 20 TABLET ORAL at 09:12

## 2018-05-04 RX ADMIN — LEVOTHYROXINE SODIUM SCH MG: 100 TABLET ORAL at 05:30

## 2018-05-04 RX ADMIN — POTASSIUM CHLORIDE SCH MEQ: 1500 TABLET, EXTENDED RELEASE ORAL at 09:12

## 2018-05-04 RX ADMIN — SPIRONOLACTONE SCH MG: 25 TABLET ORAL at 09:12

## 2018-05-04 RX ADMIN — DILTIAZEM HYDROCHLORIDE SCH MG: 120 CAPSULE, EXTENDED RELEASE ORAL at 09:12

## 2018-05-04 RX ADMIN — Medication SCH GM: at 11:11

## 2018-05-04 RX ADMIN — FLUTICASONE PROPIONATE AND SALMETEROL SCH EACH: 50; 500 POWDER RESPIRATORY (INHALATION) at 05:54

## 2018-05-04 RX ADMIN — Medication SCH GM: at 05:54

## 2018-05-04 RX ADMIN — PANTOPRAZOLE SODIUM SCH MG: 40 TABLET, DELAYED RELEASE ORAL at 09:12

## 2018-05-04 NOTE — OT ECF NOTE
Type of Note: Discharge Note 

Primary Medical Diagnosis: Generalized weakness s/p extensive hospital 
admission for COPD exacerbation, Pneumonia, Acute resp. failure 

Occupational Therapy Evaluation Date: 18



SUBJECTIVE: 

   Prior Hospitalization: [facility] 18 thru 

   Prior Level of Function: Independent with all ADLs/IADLs. Pt is the primary 
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 

   Prior Living Status: Single level house, Living with family

  

   Community Services: Support adequate, No known needs



   Home Accessibility: 

All needs on one level

Walk-in shower

Tub/shower combination



   Equipment Owned: Shower chair, 4WW, portable O2 concentrator, Raised toilet 
seat with arms

   Medical Complications/Past Medical History: COPD, CKD, Hyperlipidemia, 
Depression, Anxiety, HT, Hx breast cancer. Please refer to EMR for extensive 
details



   Psychosocial Support: Supportive spouse and supportive sons who do not live 
in [place]

   Pain Scale (0-10): None reported at time of evaluation 

   

OBJECTIVE:

   Strength: 

      MMT:         Right   Left

      Shoulder Flexion      WFL                 WFL 

      Elbow Flexion         WFL                 WFL 

               Wrist Extension         WFL                 WFL 

               WFL                 WFL 

         (5= normal, 4= good, 3= fair, 2= poor, 1= trace)



   ROM: Both upper extremities, WFL

   

   Sensation: Intact, No concerns 

   Functional Transfer: 

      Assistive Device: 4WW

      Transfer Ability: Mod (I). 2L O2 throughout activity. 

   ADL:

      Upper body dressing:  

      Assistive device: None   

      Upper body dressing ability: Independent 



      Lower body dressing:

      Assistive device: None

      Lower body dressing ability: Independent



      Toileting:

      Assistive device:  Raised toilet seat with arms

      Toileting ability:  Modified Independent 



      Grooming/hygiene: 

      Assistive device: None

      Grooming ability: Independent 



      Bathing:

      Assistive device: Shower chair

      Bathing ability: Independent 



Standardized Assessment:

   Barthel Index of Activities of Daily Livin/20 upon initial evaluation (). 20/20 at discharge (18). 



ASSESSMENT:  "Linda" presented to F with decreased activity tolerance for 
engagement in ADLs/IADLs. She has met all skilled OT goals.



   Problem List/Current Limitations:   

Decreased activity tolerance 

Decreased strength

Decreased ROM

Generalized weakness

Shortness of breath



   Short Term Goals: 

1) Pt will be SBA grooming/hygiene. GOAL MET.

2) Pt will be SBA toilet task. GOAL MET.

3) Pt will be SBA UB/LB dressing. GOAL MET.

4) Pt will be SBA shower task. GOAL MET.

5) Pt Barthel Index of ADLs score will increase by 2 points. GOAL MET.

6) Pt will be educated on energy conservation and appropriate AE needs. GOAL 
MET.



   Long Term Goals: Return home with  services 

   Patient Goals: Return home

   Rehabilitation Prognosis: Good

   Barriers to Discharge: Medical history, current everyday smoker 



      

PLAN: The patient will discharge home with  services and assist from family. 
Pt presents with no further questions/concerns for OT at time of discharge. 

   



Thank you for this referral.  If you have any questions, concerns, or comments 
about this report or plan, please contact me at [phone].



Janeth Rivera MS, OTR/L

Occupational Therapist

CARLOS

## 2018-05-22 ENCOUNTER — HOSPITAL ENCOUNTER (OUTPATIENT)
Dept: HOSPITAL 89 - RAD | Age: 78
End: 2018-05-22
Attending: INTERNAL MEDICINE
Payer: MEDICARE

## 2018-05-22 VITALS — BODY MASS INDEX: 27.63 KG/M2

## 2018-05-22 DIAGNOSIS — I51.7: Primary | ICD-10-CM

## 2018-05-22 DIAGNOSIS — Z98.82: ICD-10-CM

## 2018-05-22 DIAGNOSIS — M85.88: ICD-10-CM

## 2018-05-22 DIAGNOSIS — J90: ICD-10-CM

## 2018-05-22 DIAGNOSIS — M25.812: ICD-10-CM

## 2018-05-22 PROCEDURE — 82947 ASSAY GLUCOSE BLOOD QUANT: CPT

## 2018-05-22 PROCEDURE — 36415 COLL VENOUS BLD VENIPUNCTURE: CPT

## 2018-05-22 PROCEDURE — 82310 ASSAY OF CALCIUM: CPT

## 2018-05-22 PROCEDURE — 82607 VITAMIN B-12: CPT

## 2018-05-22 PROCEDURE — 84132 ASSAY OF SERUM POTASSIUM: CPT

## 2018-05-22 PROCEDURE — 82746 ASSAY OF FOLIC ACID SERUM: CPT

## 2018-05-22 PROCEDURE — 82435 ASSAY OF BLOOD CHLORIDE: CPT

## 2018-05-22 PROCEDURE — 83540 ASSAY OF IRON: CPT

## 2018-05-22 PROCEDURE — 83880 ASSAY OF NATRIURETIC PEPTIDE: CPT

## 2018-05-22 PROCEDURE — 84295 ASSAY OF SERUM SODIUM: CPT

## 2018-05-22 PROCEDURE — 83550 IRON BINDING TEST: CPT

## 2018-05-22 PROCEDURE — 82565 ASSAY OF CREATININE: CPT

## 2018-05-22 PROCEDURE — 71046 X-RAY EXAM CHEST 2 VIEWS: CPT

## 2018-05-22 PROCEDURE — 84520 ASSAY OF UREA NITROGEN: CPT

## 2018-05-22 PROCEDURE — 84443 ASSAY THYROID STIM HORMONE: CPT

## 2018-05-22 PROCEDURE — 82374 ASSAY BLOOD CARBON DIOXIDE: CPT

## 2018-05-22 NOTE — RADIOLOGY IMAGING REPORT
[place] [facility] [place] 

 

PATIENT NAME: Danica Zapata

: 1940

MR: [id number]

V: [id number]

EXAM DATE: 

ORDERING PHYSICIAN: SAEED FOSTER

TECHNOLOGIST: 

 

Location: [facility]

Patient: Danica Zapata

: 1940

MRN: [id number]

Visit/Account:[id number]

Date of Sevice:  2018

 

ACCESSION #: [id number]

 

CHEST PA AND LAT

 

HISTORY: Evaluate for pneumonia.  Back pain.

 

COMPARISON: Chest x-ray April 15, 2018.

 

FINDINGS:

 

Cardiomediastinal contours: The heart is minimally enlarged.

Lungs and pleura: Comparison the previous study shows that there has been near-complete clearing of t
he previously noted bilateral pleural effusions.  There may be a small amount residual pleural fluid 
on the right side, but there is no infiltrate.

Bones/soft tissues: Osteopenia without fracture.

Chest wall: Pericapsular calcification of the left breast prosthesis.

 

IMPRESSION:

 

1.  No findings of an infiltrate.

2.  Near complete clearing of the previous noted bilateral pleural effusions.  There is a small amoun
t of residual pleural fluid on the right side.

3.  Left breast prosthesis with pericapsular calcification.

 

 

Report Dictated By: lBake Marcum MD at 2018 11:13 AM

 

Report E-Signed By: Blake Marcum MD  at 2018 11:16 AM

 

WSN:[id number]

## 2018-05-25 ENCOUNTER — HOSPITAL ENCOUNTER (OUTPATIENT)
Dept: HOSPITAL 89 - RESP | Age: 78
End: 2018-05-25
Attending: INTERNAL MEDICINE
Payer: MEDICARE

## 2018-05-25 VITALS — BODY MASS INDEX: 27.63 KG/M2

## 2018-05-25 DIAGNOSIS — I47.1: Primary | ICD-10-CM

## 2018-05-25 PROCEDURE — 93225 XTRNL ECG REC<48 HRS REC: CPT

## 2018-05-29 NOTE — RT HOLTER TEST
[place] [facility] [place] 

 

PATIENT NAME: CARLINE JACKSON

: [id number]

MR: [id number]

V: [id number]

EXAM DATE: 

ORDERING PHYSICIAN: BOOKER SHETH

TECHNOLOGIST: KARINE

 

Hook-up date: 2018   13:22:00       Duration: 47:59:00

Test Indications: SVT

Medications: 

              

              

              

              

              

              

              

[id number] QRS complexes

  1545 Ventricular      ectopics which represent    <1 % of total QRS comp.

   536 Supraventricular ectopics which represent    <1 % of total QRS comp.

     * Paced QRS complexes       which represent  **** % of total QRS comp.

VENTRICULAR ECTOPY

  1467 Isolated

     0 Bigeminal Cycles

    37 Couplets

     1 Runs

     4 Beats in Runs

     4 Beats LONGEST at  91  BPM at 13:56:02 2018

     4 Beats FASTEST at  91  BPM at 13:56:02 2018

SUPRAVENTRICULAR ECTOPY

   483 Isolated

    17 Couplets

     6 Runs

    19 Beats in Runs

     4 Beats LONGEST at 151  BPM at 12:00:35 2018

     4 Beats FASTEST at 151  BPM at 12:00:35 2018

HEART RATES

    63 MIN at 04:23:39 2018

    83 AVG

   154 MAX at 11:18:37 2018

LONGEST RR

     1.296 secs at 06:31:19 2018

S-T LEVELS

     Channel 1

          -12.800 mm MIN at 13:22:00 2018

          -12.800 mm MAX at 13:22:00 2018

     Channel 2

          -12.800 mm MIN at 13:22:00 2018

          -12.800 mm MAX at 13:22:00 2018

     Channel 3

          -12.800 mm MIN at 13:22:00 2018

          -12.800 mm MAX at 13:22:00 2018

 

Maximum heart rate was sinus tachycardia at 154 beats per minute (BPM). Minimum heart rate was normal
 sinus rhythm at 63 BPM.  Average heart rate was

83 BPM. There were runs of atrial flutter with variable block noted as well. There were 483 isolated 
 PACs, one 4 beat run and 17 couplets. There were

1467 isolated PVCs, 37 couplets and 2 runs of trigeminy.

Confirmed by GABBY CORRAL ([id number]) on 2018 6:29:37 AM

 

Referred By:             Overread By: GABBY CORRAL